# Patient Record
Sex: FEMALE | Race: BLACK OR AFRICAN AMERICAN | Employment: OTHER | URBAN - METROPOLITAN AREA
[De-identification: names, ages, dates, MRNs, and addresses within clinical notes are randomized per-mention and may not be internally consistent; named-entity substitution may affect disease eponyms.]

---

## 2017-04-25 ENCOUNTER — APPOINTMENT (OUTPATIENT)
Dept: GENERAL RADIOLOGY | Age: 77
End: 2017-04-25
Attending: EMERGENCY MEDICINE
Payer: MEDICARE

## 2017-04-25 ENCOUNTER — HOSPITAL ENCOUNTER (EMERGENCY)
Age: 77
Discharge: HOME | End: 2017-04-25
Attending: EMERGENCY MEDICINE
Payer: MEDICARE

## 2017-04-25 VITALS
WEIGHT: 201 LBS | BODY MASS INDEX: 31.55 KG/M2 | HEART RATE: 75 BPM | OXYGEN SATURATION: 98 % | SYSTOLIC BLOOD PRESSURE: 120 MMHG | RESPIRATION RATE: 20 BRPM | HEIGHT: 67 IN | DIASTOLIC BLOOD PRESSURE: 55 MMHG | TEMPERATURE: 98.9 F

## 2017-04-25 DIAGNOSIS — N30.00 ACUTE CYSTITIS WITHOUT HEMATURIA: ICD-10-CM

## 2017-04-25 DIAGNOSIS — K59.00 CONSTIPATION, UNSPECIFIED CONSTIPATION TYPE: Primary | ICD-10-CM

## 2017-04-25 LAB
ALBUMIN SERPL BCP-MCNC: 2.9 G/DL (ref 3.2–4.6)
ALBUMIN/GLOB SERPL: 0.6 {RATIO} (ref 1.2–3.5)
ALP SERPL-CCNC: 69 U/L (ref 50–136)
ALT SERPL-CCNC: 18 U/L (ref 12–65)
ANION GAP BLD CALC-SCNC: 11 MMOL/L (ref 7–16)
AST SERPL W P-5'-P-CCNC: 18 U/L (ref 15–37)
BACTERIA URNS QL MICRO: ABNORMAL /HPF
BASOPHILS # BLD AUTO: 0 K/UL (ref 0–0.2)
BASOPHILS # BLD: 0 % (ref 0–2)
BILIRUB SERPL-MCNC: 0.9 MG/DL (ref 0.2–1.1)
BUN SERPL-MCNC: 30 MG/DL (ref 8–23)
CALCIUM SERPL-MCNC: 8.9 MG/DL (ref 8.3–10.4)
CASTS URNS QL MICRO: ABNORMAL /LPF
CHLORIDE SERPL-SCNC: 114 MMOL/L (ref 98–107)
CO2 SERPL-SCNC: 25 MMOL/L (ref 21–32)
CREAT SERPL-MCNC: 1.54 MG/DL (ref 0.6–1)
DIFFERENTIAL METHOD BLD: NORMAL
EOSINOPHIL # BLD: 0.1 K/UL (ref 0–0.8)
EOSINOPHIL NFR BLD: 1 % (ref 0.5–7.8)
EPI CELLS #/AREA URNS HPF: ABNORMAL /HPF
ERYTHROCYTE [DISTWIDTH] IN BLOOD BY AUTOMATED COUNT: 13.7 % (ref 11.9–14.6)
GLOBULIN SER CALC-MCNC: 4.6 G/DL (ref 2.3–3.5)
GLUCOSE SERPL-MCNC: 198 MG/DL (ref 65–100)
HCT VFR BLD AUTO: 36.4 % (ref 35.8–46.3)
HGB BLD-MCNC: 12.6 G/DL (ref 11.7–15.4)
IMM GRANULOCYTES # BLD: 0 K/UL (ref 0–0.5)
IMM GRANULOCYTES NFR BLD AUTO: 0.4 % (ref 0–5)
INR PPP: 1.1 (ref 0.9–1.2)
LIPASE SERPL-CCNC: 126 U/L (ref 73–393)
LYMPHOCYTES # BLD AUTO: 28 % (ref 13–44)
LYMPHOCYTES # BLD: 3.1 K/UL (ref 0.5–4.6)
MCH RBC QN AUTO: 30.3 PG (ref 26.1–32.9)
MCHC RBC AUTO-ENTMCNC: 34.6 G/DL (ref 31.4–35)
MCV RBC AUTO: 87.5 FL (ref 79.6–97.8)
MONOCYTES # BLD: 0.8 K/UL (ref 0.1–1.3)
MONOCYTES NFR BLD AUTO: 7 % (ref 4–12)
NEUTS SEG # BLD: 7.1 K/UL (ref 1.7–8.2)
NEUTS SEG NFR BLD AUTO: 64 % (ref 43–78)
PLATELET # BLD AUTO: 150 K/UL (ref 150–450)
PMV BLD AUTO: 11.3 FL (ref 10.8–14.1)
POTASSIUM SERPL-SCNC: 4.2 MMOL/L (ref 3.5–5.1)
PROT SERPL-MCNC: 7.5 G/DL (ref 6.3–8.2)
PROTHROMBIN TIME: 11.9 SEC (ref 9.6–12)
RBC # BLD AUTO: 4.16 M/UL (ref 4.05–5.25)
RBC #/AREA URNS HPF: ABNORMAL /HPF
SODIUM SERPL-SCNC: 150 MMOL/L (ref 136–145)
WBC # BLD AUTO: 11.1 K/UL (ref 4.3–11.1)
WBC URNS QL MICRO: ABNORMAL /HPF

## 2017-04-25 PROCEDURE — 85610 PROTHROMBIN TIME: CPT | Performed by: EMERGENCY MEDICINE

## 2017-04-25 PROCEDURE — 81003 URINALYSIS AUTO W/O SCOPE: CPT | Performed by: EMERGENCY MEDICINE

## 2017-04-25 PROCEDURE — 74011250636 HC RX REV CODE- 250/636: Performed by: EMERGENCY MEDICINE

## 2017-04-25 PROCEDURE — 87086 URINE CULTURE/COLONY COUNT: CPT | Performed by: EMERGENCY MEDICINE

## 2017-04-25 PROCEDURE — 96361 HYDRATE IV INFUSION ADD-ON: CPT | Performed by: EMERGENCY MEDICINE

## 2017-04-25 PROCEDURE — 80053 COMPREHEN METABOLIC PANEL: CPT | Performed by: EMERGENCY MEDICINE

## 2017-04-25 PROCEDURE — 85025 COMPLETE CBC W/AUTO DIFF WBC: CPT | Performed by: EMERGENCY MEDICINE

## 2017-04-25 PROCEDURE — 99285 EMERGENCY DEPT VISIT HI MDM: CPT | Performed by: EMERGENCY MEDICINE

## 2017-04-25 PROCEDURE — 96372 THER/PROPH/DIAG INJ SC/IM: CPT | Performed by: EMERGENCY MEDICINE

## 2017-04-25 PROCEDURE — 74011250636 HC RX REV CODE- 250/636

## 2017-04-25 PROCEDURE — 81015 MICROSCOPIC EXAM OF URINE: CPT | Performed by: EMERGENCY MEDICINE

## 2017-04-25 PROCEDURE — 87186 SC STD MICRODIL/AGAR DIL: CPT | Performed by: EMERGENCY MEDICINE

## 2017-04-25 PROCEDURE — 51701 INSERT BLADDER CATHETER: CPT | Performed by: EMERGENCY MEDICINE

## 2017-04-25 PROCEDURE — 83690 ASSAY OF LIPASE: CPT | Performed by: EMERGENCY MEDICINE

## 2017-04-25 PROCEDURE — 87088 URINE BACTERIA CULTURE: CPT | Performed by: EMERGENCY MEDICINE

## 2017-04-25 PROCEDURE — 74020 XR ABD (AP AND ERECT OR DECUB): CPT

## 2017-04-25 PROCEDURE — 77030011943

## 2017-04-25 PROCEDURE — 74011000250 HC RX REV CODE- 250: Performed by: EMERGENCY MEDICINE

## 2017-04-25 PROCEDURE — 96365 THER/PROPH/DIAG IV INF INIT: CPT | Performed by: EMERGENCY MEDICINE

## 2017-04-25 PROCEDURE — 74011000258 HC RX REV CODE- 258: Performed by: EMERGENCY MEDICINE

## 2017-04-25 RX ORDER — MAGNESIUM CITRATE
296 SOLUTION, ORAL ORAL
Qty: 1 BOTTLE | Refills: 0 | Status: SHIPPED | OUTPATIENT
Start: 2017-04-25 | End: 2017-04-25

## 2017-04-25 RX ORDER — CEPHALEXIN 500 MG/1
500 CAPSULE ORAL 3 TIMES DAILY
Qty: 21 CAP | Refills: 0 | Status: SHIPPED | OUTPATIENT
Start: 2017-04-25 | End: 2017-05-02

## 2017-04-25 RX ADMIN — SODIUM CHLORIDE 1000 ML: 900 INJECTION, SOLUTION INTRAVENOUS at 02:52

## 2017-04-25 RX ADMIN — WATER 10 MG: 1 INJECTION INTRAMUSCULAR; INTRAVENOUS; SUBCUTANEOUS at 04:33

## 2017-04-25 RX ADMIN — CEFTRIAXONE 1 G: 1 INJECTION, POWDER, FOR SOLUTION INTRAMUSCULAR; INTRAVENOUS at 03:15

## 2017-04-25 NOTE — ED TRIAGE NOTES
Per EMS Nursing home states bowel movements have decreased along with appetite. History dementia and Alzheimer. . Pt is Bed bound.

## 2017-04-25 NOTE — DISCHARGE INSTRUCTIONS
Constipation: Care Instructions  Your Care Instructions  Constipation means that you have a hard time passing stools (bowel movements). People pass stools from 3 times a day to once every 3 days. What is normal for you may be different. Constipation may occur with pain in the rectum and cramping. The pain may get worse when you try to pass stools. Sometimes there are small amounts of bright red blood on toilet paper or the surface of stools. This is because of enlarged veins near the rectum (hemorrhoids). A few changes in your diet and lifestyle may help you avoid ongoing constipation. Your doctor may also prescribe medicine to help loosen your stool. Some medicines can cause constipation. These include pain medicines and antidepressants. Tell your doctor about all the medicines you take. Your doctor may want to make a medicine change to ease your symptoms. Follow-up care is a key part of your treatment and safety. Be sure to make and go to all appointments, and call your doctor if you are having problems. It's also a good idea to know your test results and keep a list of the medicines you take. How can you care for yourself at home? · Drink plenty of fluids, enough so that your urine is light yellow or clear like water. If you have kidney, heart, or liver disease and have to limit fluids, talk with your doctor before you increase the amount of fluids you drink. · Include high-fiber foods in your diet each day. These include fruits, vegetables, beans, and whole grains. · Get at least 30 minutes of exercise on most days of the week. Walking is a good choice. You also may want to do other activities, such as running, swimming, cycling, or playing tennis or team sports. · Take a fiber supplement, such as Citrucel or Metamucil, every day. Read and follow all instructions on the label. · Schedule time each day for a bowel movement. A daily routine may help.  Take your time having your bowel movement. · Support your feet with a small step stool when you sit on the toilet. This helps flex your hips and places your pelvis in a squatting position. · Your doctor may recommend an over-the-counter laxative to relieve your constipation. Examples are Milk of Magnesia and MiraLax. Read and follow all instructions on the label. Do not use laxatives on a long-term basis. When should you call for help? Call your doctor now or seek immediate medical care if:  · You have new or worse belly pain. · You have new or worse nausea or vomiting. · You have blood in your stools. Watch closely for changes in your health, and be sure to contact your doctor if:  · Your constipation is getting worse. · You do not get better as expected. Where can you learn more? Go to http://ilda-julia.info/. Enter 21 [id number] in the search box to learn more about \"Constipation: Care Instructions. \"  Current as of: May 27, 2016  Content Version: 11.2  © 6177-0953 [employer]. Care instructions adapted under license by [employer] (which disclaims liability or warranty for this information). If you have questions about a medical condition or this instruction, always ask your healthcare professional. [place] [address] any warranty or liability for your use of this information. Urinary Tract Infection in Women: Care Instructions  Your Care Instructions    A urinary tract infection, or UTI, is a general term for an infection anywhere between the kidneys and the urethra (where urine comes out). Most UTIs are bladder infections. They often cause pain or burning when you urinate. UTIs are caused by bacteria and can be cured with antibiotics. Be sure to complete your treatment so that the infection goes away. Follow-up care is a key part of your treatment and safety. Be sure to make and go to all appointments, and call your doctor if you are having problems.  It's also a good idea to know your test results and keep a list of the medicines you take. How can you care for yourself at home? · Take your antibiotics as directed. Do not stop taking them just because you feel better. You need to take the full course of antibiotics. · Drink extra water and other fluids for the next day or two. This may help wash out the bacteria that are causing the infection. (If you have kidney, heart, or liver disease and have to limit fluids, talk with your doctor before you increase your fluid intake.)  · Avoid drinks that are carbonated or have caffeine. They can irritate the bladder. · Urinate often. Try to empty your bladder each time. · To relieve pain, take a hot bath or lay a heating pad set on low over your lower belly or genital area. Never go to sleep with a heating pad in place. To prevent UTIs  · Drink plenty of water each day. This helps you urinate often, which clears bacteria from your system. (If you have kidney, heart, or liver disease and have to limit fluids, talk with your doctor before you increase your fluid intake.)  · Urinate when you need to. · Urinate right after you have sex. · Change sanitary pads often. · Avoid douches, bubble baths, feminine hygiene sprays, and other feminine hygiene products that have deodorants. · After going to the bathroom, wipe from front to back. When should you call for help? Call your doctor now or seek immediate medical care if:  · Symptoms such as fever, chills, nausea, or vomiting get worse or appear for the first time. · You have new pain in your back just below your rib cage. This is called flank pain. · There is new blood or pus in your urine. · You have any problems with your antibiotic medicine. Watch closely for changes in your health, and be sure to contact your doctor if:  · You are not getting better after taking an antibiotic for 2 days. · Your symptoms go away but then come back. Where can you learn more?   Go to http://ilda-julia.info/. Enter W257 in the search box to learn more about \"Urinary Tract Infection in Women: Care Instructions. \"  Current as of: November 28, 2016  Content Version: 11.2  © 6250-9535 [employer]. Care instructions adapted under license by [employer] (which disclaims liability or warranty for this information). If you have questions about a medical condition or this instruction, always ask your healthcare professional. [address] any warranty or liability for your use of this information.

## 2017-04-25 NOTE — ED NOTES
I have reviewed discharge instructions with the patient daughter. The patient daughter verbalized understanding.

## 2017-04-25 NOTE — ED PROVIDER NOTES
HPI Comments: Patient with a history of dementia presents to the ER for concerns of her constipation. Patient has a history of dementia, apparently has been not having bowel movemts for the past 5 days and appears to be in some discomfort. They also report decreased by mouth intake. There is no reported vomiting    Patient is a 78 y.o. female presenting with constipation. The history is provided by the EMS personnel. The history is limited by the condition of the patient. Constipation    This is a new problem. The current episode started more than 2 days ago. Associated symptoms include abdominal pain and constipation. Pertinent negatives include no fever and no back pain. Her past medical history does not include dementia or neurologic disease. Past Medical History:   Diagnosis Date    Alzheimer disease     Anxiety     Arthritis     Asthma     CAD (coronary artery disease)     CHF    Cataract     COPD     DEMENTIA     Diabetes ([address]     GERD (gastroesophageal reflux disease)     H/O seasonal allergies     Hyperlipidemia     Hypertension     Hypothyroidism     Magnesium disorder     Peripheral neuropathy ([address]     Psychiatric disorder     Psychosis     Schizoaffective disorder ([address]     Stroke [facility]     TIA    Tardive dyskinesia        Past Surgical History:   Procedure Laterality Date    HX CHOLECYSTECTOMY      feeding tube placed and removed    HX TOTAL ABDOMINAL HYSTERECTOMY      unsure when or for what reason         Family History:   Problem Relation Age of Onset    Depression Mother     Arthritis-osteo Mother     Thyroid Disease Mother     Heart Attack Mother     Hypertension Mother        Social History     Social History    Marital status:      Spouse name: N/A    Number of children: N/A    Years of education: N/A     Occupational History    Not on file.      Social History Main Topics    Smoking status: Never Smoker    Smokeless tobacco: Not on file    Alcohol use No    Drug use: No    Sexual activity: No     Other Topics Concern    Not on file     Social History Narrative         ALLERGIES: Zithromax [azithromycin]    Review of Systems   Constitutional: Negative for fatigue and fever. HENT: Negative for congestion, dental problem, trouble swallowing and voice change. Eyes: Negative for photophobia and visual disturbance. Respiratory: Negative for shortness of breath, wheezing and stridor. Cardiovascular: Negative for leg swelling. Gastrointestinal: Positive for abdominal pain and constipation. Endocrine: Negative for polydipsia and polyphagia. Genitourinary: Negative for flank pain, frequency and urgency. Musculoskeletal: Negative for back pain and gait problem. Skin: Negative for pallor. Allergic/Immunologic: Negative for food allergies and immunocompromised state. Neurological: Negative for light-headedness and numbness. Hematological: Negative for adenopathy. Does not bruise/bleed easily. Psychiatric/Behavioral: Negative for behavioral problems and confusion. All other systems reviewed and are negative. Vitals:    04/25/17 0035   Pulse: 61   Resp: 20   Temp: 99.2 °F (37.3 °C)   SpO2: 100%   Weight: 91.2 kg (201 lb)   Height: 5' 7\" (1.702 m)            Physical Exam   Constitutional: She appears well-developed and well-nourished. HENT:   Head: Normocephalic and atraumatic. Mouth/Throat: Oropharynx is clear and moist.   Eyes: Conjunctivae and EOM are normal. Pupils are equal, round, and reactive to light. No scleral icterus. Neck: Normal range of motion. Neck supple. No JVD present. No thyromegaly present. Cardiovascular: Normal rate and regular rhythm. Exam reveals no friction rub. No murmur heard. Pulmonary/Chest: Effort normal and breath sounds normal. No respiratory distress. She has no wheezes. Abdominal: Soft. Bowel sounds are normal. She exhibits no distension. There is tenderness.    Musculoskeletal: Normal range of motion. She exhibits no edema or deformity. Neurological: She is alert. Skin: Skin is warm and dry. Nursing note and vitals reviewed. MDM  Number of Diagnoses or Management Options  Diagnosis management comments: She appears to have some minimal abdominal tenderness upon examination   We'll obtain x-ray to rule out obstruction  Also check basic labs as well as urinalysis    3:58 AM  Labs stable evidence of dehydration is noted by her elevated sodium and mildly creatinine  Urinalysis c/w UTI  Pt treated with IV fluid and Rocephin  Appears comfortable and stable for D/c       Amount and/or Complexity of Data Reviewed  Clinical lab tests: ordered and reviewed  Tests in the radiology section of CPT®: ordered and reviewed    Risk of Complications, Morbidity, and/or Mortality  Presenting problems: high  Diagnostic procedures: moderate  Management options: high    Patient Progress  Patient progress: stable    ED Course       Procedures    Results Include:    Recent Results (from the past 24 hour(s))   CBC WITH AUTOMATED DIFF    Collection Time: 04/25/17  1:25 AM   Result Value Ref Range    WBC 11.1 4.3 - 11.1 K/uL    RBC 4.16 4.05 - 5.25 M/uL    HGB 12.6 11.7 - 15.4 g/dL    HCT 36.4 35.8 - 46.3 %    MCV 87.5 79.6 - 97.8 FL    MCH 30.3 26.1 - 32.9 PG    MCHC 34.6 31.4 - 35.0 g/dL    RDW 13.7 11.9 - 14.6 %    PLATELET 721 010 - 921 K/uL    MPV 11.3 10.8 - 14.1 FL    DF AUTOMATED      NEUTROPHILS 64 43 - 78 %    LYMPHOCYTES 28 13 - 44 %    MONOCYTES 7 4.0 - 12.0 %    EOSINOPHILS 1 0.5 - 7.8 %    BASOPHILS 0 0.0 - 2.0 %    IMMATURE GRANULOCYTES 0.4 0.0 - 5.0 %    ABS. NEUTROPHILS 7.1 1.7 - 8.2 K/UL    ABS. LYMPHOCYTES 3.1 0.5 - 4.6 K/UL    ABS. MONOCYTES 0.8 0.1 - 1.3 K/UL    ABS. EOSINOPHILS 0.1 0.0 - 0.8 K/UL    ABS. BASOPHILS 0.0 0.0 - 0.2 K/UL    ABS. IMM.  GRANS. 0.0 0.0 - 0.5 K/UL   METABOLIC PANEL, COMPREHENSIVE    Collection Time: 04/25/17  1:25 AM   Result Value Ref Range    Sodium 150 (H) 136 - 145 mmol/L    Potassium 4.2 3.5 - 5.1 mmol/L    Chloride 114 (H) 98 - 107 mmol/L    CO2 25 21 - 32 mmol/L    Anion gap 11 7 - 16 mmol/L    Glucose 198 (H) 65 - 100 mg/dL    BUN 30 (H) 8 - 23 MG/DL    Creatinine 1.54 (H) 0.6 - 1.0 MG/DL    GFR est AA 42 (L) >60 ml/min/1.73m2    GFR est non-AA 35 (L) >60 ml/min/1.73m2    Calcium 8.9 8.3 - 10.4 MG/DL    Bilirubin, total 0.9 0.2 - 1.1 MG/DL    ALT (SGPT) 18 12 - 65 U/L    AST (SGOT) 18 15 - 37 U/L    Alk. phosphatase 69 50 - 136 U/L    Protein, total 7.5 6.3 - 8.2 g/dL    Albumin 2.9 (L) 3.2 - 4.6 g/dL    Globulin 4.6 (H) 2.3 - 3.5 g/dL    A-G Ratio 0.6 (L) 1.2 - 3.5     PROTHROMBIN TIME + INR    Collection Time: 04/25/17  1:25 AM   Result Value Ref Range    Prothrombin time 11.9 9.6 - 12.0 sec    INR 1.1 0.9 - 1.2     LIPASE    Collection Time: 04/25/17  1:25 AM   Result Value Ref Range    Lipase 126 73 - 393 U/L   URINE MICROSCOPIC    Collection Time: 04/25/17  2:39 AM   Result Value Ref Range    WBC 0-3 0 /hpf    RBC 0-3 0 /hpf    Epithelial cells 0-3 0 /hpf    Bacteria 4+ (H) 0 /hpf    Casts 0-3 0 /lpf       XR ABD (AP AND ERECT OR DECUB) (Final result) Result time: 04/25/17 02:54:07     Final result by Aure Bennett MD (04/25/17 02:54:07)     Impression:     IMPRESSION: Suboptimal exam without acute findings.     Narrative:     Exam: Supine radiograph of the abdomen    INDICATION: Abdominal pain, constipation    COMPARISON: Acute abdominal series from January 31, 2012    FINDINGS: Suboptimal exam due to motion artifact. Nonspecific bowel gas pattern. No suspicious abdominal calcifications. No gross acute osseous abnormality.

## 2017-04-28 LAB
BACTERIA SPEC CULT: ABNORMAL
BACTERIA SPEC CULT: ABNORMAL
SERVICE CMNT-IMP: ABNORMAL

## 2017-05-20 ENCOUNTER — APPOINTMENT (OUTPATIENT)
Dept: GENERAL RADIOLOGY | Age: 77
DRG: 392 | End: 2017-05-20
Attending: EMERGENCY MEDICINE
Payer: MEDICARE

## 2017-05-20 ENCOUNTER — HOSPITAL ENCOUNTER (INPATIENT)
Age: 77
LOS: 6 days | Discharge: NURSING, REHAB OR LONG-TERM CARE | DRG: 392 | End: 2017-05-26
Attending: EMERGENCY MEDICINE | Admitting: INTERNAL MEDICINE
Payer: MEDICARE

## 2017-05-20 DIAGNOSIS — E87.0 HYPERNATREMIA: Primary | ICD-10-CM

## 2017-05-20 DIAGNOSIS — F02.80 ALZHEIMER'S DEMENTIA WITHOUT BEHAVIORAL DISTURBANCE, UNSPECIFIED TIMING OF DEMENTIA ONSET: ICD-10-CM

## 2017-05-20 DIAGNOSIS — G30.9 ALZHEIMER'S DEMENTIA WITHOUT BEHAVIORAL DISTURBANCE, UNSPECIFIED TIMING OF DEMENTIA ONSET: ICD-10-CM

## 2017-05-20 PROBLEM — N17.9 AKI (ACUTE KIDNEY INJURY) (HCC): Status: ACTIVE | Noted: 2017-05-20

## 2017-05-20 LAB
ALBUMIN SERPL BCP-MCNC: 3.6 G/DL (ref 3.2–4.6)
ALBUMIN/GLOB SERPL: 0.8 {RATIO} (ref 1.2–3.5)
ALP SERPL-CCNC: 82 U/L (ref 50–136)
ALT SERPL-CCNC: 54 U/L (ref 12–65)
ANION GAP BLD CALC-SCNC: 11 MMOL/L (ref 7–16)
APPEARANCE UR: CLEAR
AST SERPL W P-5'-P-CCNC: 29 U/L (ref 15–37)
ATRIAL RATE: 100 BPM
BASOPHILS # BLD AUTO: 0.1 K/UL (ref 0–0.2)
BASOPHILS # BLD: 0 % (ref 0–2)
BILIRUB SERPL-MCNC: 1.3 MG/DL (ref 0.2–1.1)
BILIRUB UR QL: NEGATIVE
BUN SERPL-MCNC: 59 MG/DL (ref 8–23)
CALCIUM SERPL-MCNC: 9.8 MG/DL (ref 8.3–10.4)
CALCULATED P AXIS, ECG09: 108 DEGREES
CALCULATED R AXIS, ECG10: 49 DEGREES
CALCULATED T AXIS, ECG11: 66 DEGREES
CHLORIDE SERPL-SCNC: 125 MMOL/L (ref 98–107)
CO2 SERPL-SCNC: 24 MMOL/L (ref 21–32)
COLOR UR: YELLOW
CREAT SERPL-MCNC: 2.49 MG/DL (ref 0.6–1)
DIAGNOSIS, 93000: NORMAL
DIFFERENTIAL METHOD BLD: ABNORMAL
EOSINOPHIL # BLD: 0 K/UL (ref 0–0.8)
EOSINOPHIL NFR BLD: 0 % (ref 0.5–7.8)
ERYTHROCYTE [DISTWIDTH] IN BLOOD BY AUTOMATED COUNT: 14.3 % (ref 11.9–14.6)
GLOBULIN SER CALC-MCNC: 4.7 G/DL (ref 2.3–3.5)
GLUCOSE BLD STRIP.AUTO-MCNC: 213 MG/DL (ref 65–100)
GLUCOSE SERPL-MCNC: 241 MG/DL (ref 65–100)
GLUCOSE UR STRIP.AUTO-MCNC: NEGATIVE MG/DL
HCT VFR BLD AUTO: 42.8 % (ref 35.8–46.3)
HGB BLD-MCNC: 14.5 G/DL (ref 11.7–15.4)
HGB UR QL STRIP: NEGATIVE
IMM GRANULOCYTES # BLD: 0 K/UL (ref 0–0.5)
IMM GRANULOCYTES NFR BLD AUTO: 0.3 % (ref 0–5)
KETONES UR QL STRIP.AUTO: NEGATIVE MG/DL
LACTATE BLD-SCNC: 2.3 MMOL/L (ref 0.5–1.9)
LEUKOCYTE ESTERASE UR QL STRIP.AUTO: NEGATIVE
LYMPHOCYTES # BLD AUTO: 20 % (ref 13–44)
LYMPHOCYTES # BLD: 2.4 K/UL (ref 0.5–4.6)
MCH RBC QN AUTO: 30.9 PG (ref 26.1–32.9)
MCHC RBC AUTO-ENTMCNC: 33.9 G/DL (ref 31.4–35)
MCV RBC AUTO: 91.1 FL (ref 79.6–97.8)
MONOCYTES # BLD: 1.3 K/UL (ref 0.1–1.3)
MONOCYTES NFR BLD AUTO: 11 % (ref 4–12)
NEUTS SEG # BLD: 8 K/UL (ref 1.7–8.2)
NEUTS SEG NFR BLD AUTO: 69 % (ref 43–78)
NITRITE UR QL STRIP.AUTO: NEGATIVE
P-R INTERVAL, ECG05: 102 MS
PH UR STRIP: 5.5 [PH] (ref 5–9)
PLATELET # BLD AUTO: 170 K/UL (ref 150–450)
PMV BLD AUTO: 11.9 FL (ref 10.8–14.1)
POTASSIUM SERPL-SCNC: 4.3 MMOL/L (ref 3.5–5.1)
PROCALCITONIN SERPL-MCNC: <0.1 NG/ML
PROT SERPL-MCNC: 8.3 G/DL (ref 6.3–8.2)
PROT UR STRIP-MCNC: NEGATIVE MG/DL
Q-T INTERVAL, ECG07: 350 MS
QRS DURATION, ECG06: 74 MS
QTC CALCULATION (BEZET), ECG08: 451 MS
RBC # BLD AUTO: 4.7 M/UL (ref 4.05–5.25)
SODIUM SERPL-SCNC: 160 MMOL/L (ref 136–145)
SP GR UR REFRACTOMETRY: 1.01 (ref 1–1.02)
UROBILINOGEN UR QL STRIP.AUTO: 0.2 EU/DL (ref 0.2–1)
VENTRICULAR RATE, ECG03: 100 BPM
WBC # BLD AUTO: 11.8 K/UL (ref 4.3–11.1)

## 2017-05-20 PROCEDURE — 87040 BLOOD CULTURE FOR BACTERIA: CPT

## 2017-05-20 PROCEDURE — 77030011943

## 2017-05-20 PROCEDURE — 82962 GLUCOSE BLOOD TEST: CPT

## 2017-05-20 PROCEDURE — 84145 PROCALCITONIN (PCT): CPT

## 2017-05-20 PROCEDURE — 81003 URINALYSIS AUTO W/O SCOPE: CPT | Performed by: EMERGENCY MEDICINE

## 2017-05-20 PROCEDURE — 65270000029 HC RM PRIVATE

## 2017-05-20 PROCEDURE — 74011250636 HC RX REV CODE- 250/636: Performed by: EMERGENCY MEDICINE

## 2017-05-20 PROCEDURE — 81003 URINALYSIS AUTO W/O SCOPE: CPT | Performed by: INTERNAL MEDICINE

## 2017-05-20 PROCEDURE — 74011250636 HC RX REV CODE- 250/636: Performed by: INTERNAL MEDICINE

## 2017-05-20 PROCEDURE — 36415 COLL VENOUS BLD VENIPUNCTURE: CPT | Performed by: INTERNAL MEDICINE

## 2017-05-20 PROCEDURE — 87205 SMEAR GRAM STAIN: CPT

## 2017-05-20 PROCEDURE — 74011636637 HC RX REV CODE- 636/637: Performed by: INTERNAL MEDICINE

## 2017-05-20 PROCEDURE — 80053 COMPREHEN METABOLIC PANEL: CPT

## 2017-05-20 PROCEDURE — 96361 HYDRATE IV INFUSION ADD-ON: CPT | Performed by: EMERGENCY MEDICINE

## 2017-05-20 PROCEDURE — 85025 COMPLETE CBC W/AUTO DIFF WBC: CPT

## 2017-05-20 PROCEDURE — 87077 CULTURE AEROBIC IDENTIFY: CPT

## 2017-05-20 PROCEDURE — 87086 URINE CULTURE/COLONY COUNT: CPT | Performed by: INTERNAL MEDICINE

## 2017-05-20 PROCEDURE — 99285 EMERGENCY DEPT VISIT HI MDM: CPT | Performed by: EMERGENCY MEDICINE

## 2017-05-20 PROCEDURE — 80048 BASIC METABOLIC PNL TOTAL CA: CPT | Performed by: INTERNAL MEDICINE

## 2017-05-20 PROCEDURE — 93005 ELECTROCARDIOGRAM TRACING: CPT | Performed by: EMERGENCY MEDICINE

## 2017-05-20 PROCEDURE — 83605 ASSAY OF LACTIC ACID: CPT

## 2017-05-20 PROCEDURE — 74011000250 HC RX REV CODE- 250: Performed by: INTERNAL MEDICINE

## 2017-05-20 PROCEDURE — 96374 THER/PROPH/DIAG INJ IV PUSH: CPT | Performed by: EMERGENCY MEDICINE

## 2017-05-20 PROCEDURE — 87186 SC STD MICRODIL/AGAR DIL: CPT

## 2017-05-20 PROCEDURE — 71010 XR CHEST PORT: CPT

## 2017-05-20 PROCEDURE — 51701 INSERT BLADDER CATHETER: CPT | Performed by: EMERGENCY MEDICINE

## 2017-05-20 PROCEDURE — 74011000258 HC RX REV CODE- 258: Performed by: INTERNAL MEDICINE

## 2017-05-20 RX ORDER — BENZTROPINE MESYLATE 1 MG/ML
0.5 INJECTION INTRAMUSCULAR; INTRAVENOUS 2 TIMES DAILY
Status: DISCONTINUED | OUTPATIENT
Start: 2017-05-20 | End: 2017-05-21

## 2017-05-20 RX ORDER — LORAZEPAM 2 MG/ML
2 INJECTION INTRAMUSCULAR
Status: DISCONTINUED | OUTPATIENT
Start: 2017-05-20 | End: 2017-05-26 | Stop reason: HOSPADM

## 2017-05-20 RX ORDER — FAMOTIDINE 10 MG/ML
20 INJECTION INTRAVENOUS EVERY 24 HOURS
Status: DISCONTINUED | OUTPATIENT
Start: 2017-05-20 | End: 2017-05-25

## 2017-05-20 RX ORDER — ONDANSETRON 2 MG/ML
4 INJECTION INTRAMUSCULAR; INTRAVENOUS
Status: DISCONTINUED | OUTPATIENT
Start: 2017-05-20 | End: 2017-05-26 | Stop reason: HOSPADM

## 2017-05-20 RX ORDER — INSULIN LISPRO 100 [IU]/ML
INJECTION, SOLUTION INTRAVENOUS; SUBCUTANEOUS
Status: DISCONTINUED | OUTPATIENT
Start: 2017-05-20 | End: 2017-05-21

## 2017-05-20 RX ORDER — LORAZEPAM 2 MG/ML
2 INJECTION INTRAMUSCULAR
Status: COMPLETED | OUTPATIENT
Start: 2017-05-20 | End: 2017-05-20

## 2017-05-20 RX ORDER — HALOPERIDOL 5 MG/ML
2 INJECTION INTRAMUSCULAR
Status: DISCONTINUED | OUTPATIENT
Start: 2017-05-20 | End: 2017-05-26 | Stop reason: HOSPADM

## 2017-05-20 RX ORDER — LEVOTHYROXINE SODIUM 75 UG/1
75 TABLET ORAL
Status: DISCONTINUED | OUTPATIENT
Start: 2017-05-21 | End: 2017-05-26 | Stop reason: HOSPADM

## 2017-05-20 RX ADMIN — FAMOTIDINE 20 MG: 10 INJECTION, SOLUTION INTRAVENOUS at 23:19

## 2017-05-20 RX ADMIN — LORAZEPAM 2 MG: 2 INJECTION, SOLUTION INTRAMUSCULAR; INTRAVENOUS at 17:50

## 2017-05-20 RX ADMIN — BENZTROPINE MESYLATE 0.5 MG: 1 INJECTION INTRAMUSCULAR; INTRAVENOUS at 23:19

## 2017-05-20 RX ADMIN — HALOPERIDOL LACTATE 2 MG: 5 INJECTION, SOLUTION INTRAMUSCULAR at 23:56

## 2017-05-20 RX ADMIN — INSULIN LISPRO 4 UNITS: 100 INJECTION, SOLUTION INTRAVENOUS; SUBCUTANEOUS at 23:29

## 2017-05-20 RX ADMIN — SODIUM CHLORIDE 1000 ML: 900 INJECTION, SOLUTION INTRAVENOUS at 16:04

## 2017-05-20 RX ADMIN — PIPERACILLIN SODIUM,TAZOBACTAM SODIUM 3.38 G: 3; .375 INJECTION, POWDER, FOR SOLUTION INTRAVENOUS at 23:20

## 2017-05-20 RX ADMIN — SODIUM CHLORIDE: 234 INJECTION INTRAMUSCULAR; INTRAVENOUS; SUBCUTANEOUS at 23:19

## 2017-05-20 NOTE — PROGRESS NOTES
Was called by ER saying there were no admission orders.   I see a note saying H&P dictated and some signed and held orders but nothing on active problems list.  i placed an order for observation for fever as per discussion with ER

## 2017-05-20 NOTE — ED PROVIDER NOTES
HPI Comments: Patient is a 82-year-old female with a history of dementia who is sent to the emergency department today from her nursing home for further evaluation. All the history is obtained from the daughter who is at the bedside. Daughter states that in the past month the patient has had a markedly decline in her function in her health. She states that her psychiatrist is changing her medications and she is having some increased tremors and abnormal movements. She is now nonverbal.   In the last 4 days she has not wanted to eat or drink and that is why the facility sent her here to the emergency department for further evaluation. Patient is a 78 y.o. female presenting with fever. The history is provided by a relative. The history is limited by the condition of the patient. Fever           Past Medical History:   Diagnosis Date    Alzheimer disease     Anxiety     Arthritis     Asthma     CAD (coronary artery disease)     CHF    Cataract     COPD     DEMENTIA     Diabetes ([address]     GERD (gastroesophageal reflux disease)     H/O seasonal allergies     Hyperlipidemia     Hypertension     Hypothyroidism     Magnesium disorder     Peripheral neuropathy ([address]     Psychiatric disorder     Psychosis     Schizoaffective disorder ([address]     Stroke ([address]     TIA    Tardive dyskinesia        Past Surgical History:   Procedure Laterality Date    HX CHOLECYSTECTOMY      feeding tube placed and removed    HX TOTAL ABDOMINAL HYSTERECTOMY      unsure when or for what reason         Family History:   Problem Relation Age of Onset    Depression Mother     Arthritis-osteo Mother     Thyroid Disease Mother     Heart Attack Mother     Hypertension Mother        Social History     Social History    Marital status:      Spouse name: N/A    Number of children: N/A    Years of education: N/A     Occupational History    Not on file.      Social History Main Topics    Smoking status: Never Smoker    Smokeless tobacco: Not on file    Alcohol use No    Drug use: No    Sexual activity: No     Other Topics Concern    Not on file     Social History Narrative         ALLERGIES: Zithromax [azithromycin]    Review of Systems   Unable to perform ROS: Dementia   Constitutional: Positive for fever. Vitals:    05/20/17 1529   BP: 132/67   Pulse: 100   Resp: 24   Temp: 99.2 °F (37.3 °C)   SpO2: 98%   Weight: 91.2 kg (201 lb)   Height: 5' 7\" (1.702 m)            Physical Exam   Constitutional: She appears well-developed. Thin elderly   HENT:   Head: Normocephalic and atraumatic. Very dry mucous membranes   Neck: Normal range of motion. Neck supple. Cardiovascular: Normal rate and regular rhythm. Abdominal: Soft. Bowel sounds are normal.   Musculoskeletal: She exhibits no tenderness or deformity. Lymphadenopathy:     She has no cervical adenopathy. Neurological:   Nonverbal with generalized weakness, no focal deficits. Repetitive tremors of the arms and pill rolling movements of the mouth. Nursing note and vitals reviewed. MDM  Number of Diagnoses or Management Options  Diagnosis management comments: Patient is a 79-year-old female with dementia who is sent to the emergency department for poor intake for several days. Differential diagnosis includes dementia, Alzheimer's, dehydration, renal failure, metabolic disturbance, infection    Laboratory values show hypernatremia with a sodium 160 and acute renal failure compared to previous values. IV fluids have been started. Urinalysis is pending. She will need admission to the hospital for further management.        Amount and/or Complexity of Data Reviewed  Clinical lab tests: ordered and reviewed  Tests in the radiology section of CPT®: ordered and reviewed  Review and summarize past medical records: yes    Risk of Complications, Morbidity, and/or Mortality  Presenting problems: moderate  Diagnostic procedures: moderate  Management options: moderate    Patient Progress  Patient progress: stable    ED Course   4:48 PM  Discussed the case with the hospitalist who will evaluate the patient in the ER for admission. Voice dictation software was used during the making of this note. This software is not perfect and grammatical and other typographical errors may be present. This note has been proofread, but may still contain errors.   Ino Phoenix MD; 5/20/2017 @4:49 PM   ===================================================================        Procedures

## 2017-05-20 NOTE — ED TRIAGE NOTES
Per EMS patient from [address] [place] [address]. Called out because patient has not been eating or drinking for 4 days to 1 week. Non purposeful  Movements and non verbal are normal. Patient febrile and tachycardic with EMS, but no known source.

## 2017-05-20 NOTE — Clinical Note
Patient Class[de-identified] Observation [099] Type of Bed: Medical [8] Reason for Observation: fever Admitting Physician: [place] [id number], [address] [[id number]] Attending Physician: [place] [id number], [address] [place] [address] [[id number]] Diagnosis: fever

## 2017-05-21 ENCOUNTER — APPOINTMENT (OUTPATIENT)
Dept: GENERAL RADIOLOGY | Age: 77
DRG: 392 | End: 2017-05-21
Attending: HOSPITALIST
Payer: MEDICARE

## 2017-05-21 LAB
ANION GAP BLD CALC-SCNC: 12 MMOL/L (ref 7–16)
ANION GAP BLD CALC-SCNC: 15 MMOL/L (ref 7–16)
ANION GAP BLD CALC-SCNC: 15 MMOL/L (ref 7–16)
ANION GAP BLD CALC-SCNC: 16 MMOL/L (ref 7–16)
BUN SERPL-MCNC: 49 MG/DL (ref 8–23)
BUN SERPL-MCNC: 51 MG/DL (ref 8–23)
BUN SERPL-MCNC: 56 MG/DL (ref 8–23)
BUN SERPL-MCNC: 59 MG/DL (ref 8–23)
CALCIUM SERPL-MCNC: 8.7 MG/DL (ref 8.3–10.4)
CALCIUM SERPL-MCNC: 8.8 MG/DL (ref 8.3–10.4)
CALCIUM SERPL-MCNC: 9.2 MG/DL (ref 8.3–10.4)
CALCIUM SERPL-MCNC: 9.2 MG/DL (ref 8.3–10.4)
CHLORIDE SERPL-SCNC: 124 MMOL/L (ref 98–107)
CHLORIDE SERPL-SCNC: 125 MMOL/L (ref 98–107)
CHLORIDE SERPL-SCNC: 126 MMOL/L (ref 98–107)
CHLORIDE SERPL-SCNC: 129 MMOL/L (ref 98–107)
CO2 SERPL-SCNC: 19 MMOL/L (ref 21–32)
CO2 SERPL-SCNC: 19 MMOL/L (ref 21–32)
CO2 SERPL-SCNC: 20 MMOL/L (ref 21–32)
CO2 SERPL-SCNC: 21 MMOL/L (ref 21–32)
CREAT SERPL-MCNC: 1.9 MG/DL (ref 0.6–1)
CREAT SERPL-MCNC: 2.06 MG/DL (ref 0.6–1)
CREAT SERPL-MCNC: 2.25 MG/DL (ref 0.6–1)
CREAT SERPL-MCNC: 2.29 MG/DL (ref 0.6–1)
ERYTHROCYTE [DISTWIDTH] IN BLOOD BY AUTOMATED COUNT: 14.5 % (ref 11.9–14.6)
GLUCOSE BLD STRIP.AUTO-MCNC: 132 MG/DL (ref 65–100)
GLUCOSE BLD STRIP.AUTO-MCNC: 391 MG/DL (ref 65–100)
GLUCOSE SERPL-MCNC: 105 MG/DL (ref 65–100)
GLUCOSE SERPL-MCNC: 154 MG/DL (ref 65–100)
GLUCOSE SERPL-MCNC: 241 MG/DL (ref 65–100)
GLUCOSE SERPL-MCNC: 340 MG/DL (ref 65–100)
HCT VFR BLD AUTO: 42.2 % (ref 35.8–46.3)
HGB BLD-MCNC: 13.6 G/DL (ref 11.7–15.4)
LACTATE SERPL-SCNC: 1.5 MMOL/L (ref 0.4–2)
LACTATE SERPL-SCNC: 2.5 MMOL/L (ref 0.4–2)
MAGNESIUM SERPL-MCNC: 2.9 MG/DL (ref 1.8–2.4)
MCH RBC QN AUTO: 30.4 PG (ref 26.1–32.9)
MCHC RBC AUTO-ENTMCNC: 32.2 G/DL (ref 31.4–35)
MCV RBC AUTO: 94.4 FL (ref 79.6–97.8)
PLATELET # BLD AUTO: 147 K/UL (ref 150–450)
PMV BLD AUTO: 13 FL (ref 10.8–14.1)
POTASSIUM SERPL-SCNC: 4.2 MMOL/L (ref 3.5–5.1)
POTASSIUM SERPL-SCNC: 4.2 MMOL/L (ref 3.5–5.1)
POTASSIUM SERPL-SCNC: 4.3 MMOL/L (ref 3.5–5.1)
POTASSIUM SERPL-SCNC: 4.6 MMOL/L (ref 3.5–5.1)
RBC # BLD AUTO: 4.47 M/UL (ref 4.05–5.25)
SODIUM SERPL-SCNC: 156 MMOL/L (ref 136–145)
SODIUM SERPL-SCNC: 159 MMOL/L (ref 136–145)
SODIUM SERPL-SCNC: 161 MMOL/L (ref 136–145)
SODIUM SERPL-SCNC: 165 MMOL/L (ref 136–145)
WBC # BLD AUTO: 14.7 K/UL (ref 4.3–11.1)

## 2017-05-21 PROCEDURE — 65270000029 HC RM PRIVATE

## 2017-05-21 PROCEDURE — 74011000258 HC RX REV CODE- 258: Performed by: INTERNAL MEDICINE

## 2017-05-21 PROCEDURE — 74011250636 HC RX REV CODE- 250/636: Performed by: INTERNAL MEDICINE

## 2017-05-21 PROCEDURE — 74011250636 HC RX REV CODE- 250/636: Performed by: HOSPITALIST

## 2017-05-21 PROCEDURE — 77030008771 HC TU NG SALEM SUMP -A

## 2017-05-21 PROCEDURE — 77030018798 HC PMP KT ENTRL FED COVD -A

## 2017-05-21 PROCEDURE — 74000 XR ABD (KUB): CPT

## 2017-05-21 PROCEDURE — 83735 ASSAY OF MAGNESIUM: CPT | Performed by: INTERNAL MEDICINE

## 2017-05-21 PROCEDURE — 36415 COLL VENOUS BLD VENIPUNCTURE: CPT | Performed by: INTERNAL MEDICINE

## 2017-05-21 PROCEDURE — 74011000250 HC RX REV CODE- 250: Performed by: INTERNAL MEDICINE

## 2017-05-21 PROCEDURE — 80048 BASIC METABOLIC PNL TOTAL CA: CPT | Performed by: INTERNAL MEDICINE

## 2017-05-21 PROCEDURE — 83605 ASSAY OF LACTIC ACID: CPT | Performed by: INTERNAL MEDICINE

## 2017-05-21 PROCEDURE — 74011636637 HC RX REV CODE- 636/637: Performed by: INTERNAL MEDICINE

## 2017-05-21 PROCEDURE — 82962 GLUCOSE BLOOD TEST: CPT

## 2017-05-21 PROCEDURE — 85027 COMPLETE CBC AUTOMATED: CPT | Performed by: INTERNAL MEDICINE

## 2017-05-21 PROCEDURE — 74011250637 HC RX REV CODE- 250/637: Performed by: INTERNAL MEDICINE

## 2017-05-21 RX ORDER — CLONAZEPAM 1 MG/1
1 TABLET ORAL 2 TIMES DAILY
Status: DISCONTINUED | OUTPATIENT
Start: 2017-05-21 | End: 2017-05-26 | Stop reason: HOSPADM

## 2017-05-21 RX ORDER — INSULIN LISPRO 100 [IU]/ML
INJECTION, SOLUTION INTRAVENOUS; SUBCUTANEOUS EVERY 6 HOURS
Status: DISCONTINUED | OUTPATIENT
Start: 2017-05-22 | End: 2017-05-26 | Stop reason: HOSPADM

## 2017-05-21 RX ORDER — VANCOMYCIN/0.9 % SOD CHLORIDE 1.5G/250ML
1500 PLASTIC BAG, INJECTION (ML) INTRAVENOUS EVERY 24 HOURS
Status: DISCONTINUED | OUTPATIENT
Start: 2017-05-21 | End: 2017-05-23

## 2017-05-21 RX ORDER — DEXTROSE MONOHYDRATE 50 MG/ML
100 INJECTION, SOLUTION INTRAVENOUS CONTINUOUS
Status: DISCONTINUED | OUTPATIENT
Start: 2017-05-21 | End: 2017-05-21

## 2017-05-21 RX ADMIN — INSULIN LISPRO 4 UNITS: 100 INJECTION, SOLUTION INTRAVENOUS; SUBCUTANEOUS at 08:48

## 2017-05-21 RX ADMIN — CLONAZEPAM 1 MG: 1 TABLET ORAL at 08:48

## 2017-05-21 RX ADMIN — LEVOTHYROXINE SODIUM 75 MCG: 75 TABLET ORAL at 08:48

## 2017-05-21 RX ADMIN — DEXTROSE MONOHYDRATE 100 ML/HR: 5 INJECTION, SOLUTION INTRAVENOUS at 00:59

## 2017-05-21 RX ADMIN — SODIUM CHLORIDE: 234 INJECTION INTRAMUSCULAR; INTRAVENOUS; SUBCUTANEOUS at 14:25

## 2017-05-21 RX ADMIN — VANCOMYCIN HYDROCHLORIDE 1500 MG: 10 INJECTION, POWDER, LYOPHILIZED, FOR SOLUTION INTRAVENOUS at 18:58

## 2017-05-21 RX ADMIN — INSULIN LISPRO 10 UNITS: 100 INJECTION, SOLUTION INTRAVENOUS; SUBCUTANEOUS at 12:18

## 2017-05-21 RX ADMIN — PIPERACILLIN SODIUM,TAZOBACTAM SODIUM 3.38 G: 3; .375 INJECTION, POWDER, FOR SOLUTION INTRAVENOUS at 14:36

## 2017-05-21 RX ADMIN — CLONAZEPAM 1 MG: 1 TABLET ORAL at 18:26

## 2017-05-21 NOTE — ED NOTES
TRANSFER - OUT REPORT:    Verbal report given to Aaron Kristal on [place]  being transferred to  for routine progression of care       Report consisted of patients Situation, Background, Assessment and   Recommendations(SBAR). Information from the following report(s) SBAR, ED Summary, Procedure Summary, Intake/Output, MAR and Recent Results was reviewed with the receiving nurse. Lines:   Peripheral IV 05/20/17 Right Antecubital (Active)   Site Assessment Clean, dry, & intact 5/20/2017  8:16 PM   Phlebitis Assessment 0 5/20/2017  8:16 PM   Infiltration Assessment 0 5/20/2017  8:16 PM   Dressing Status Clean, dry, & intact 5/20/2017  8:16 PM        Opportunity for questions and clarification was provided.       Patient transported with:   [facility]

## 2017-05-21 NOTE — PROGRESS NOTES
Pharmacokinetic Consult to Pharmacist    Libby Bauer is a 78 y.o. female with vancomycin added to zosyn for +Bcx      Height: 5' 7\" (170.2 cm)  Weight: 91.2 kg (201 lb)  Lab Results   Component Value Date/Time    BUN 59 05/21/2017 07:40 AM    Creatinine 2.29 05/21/2017 07:40 AM    WBC 14.7 05/21/2017 07:40 AM    Procalcitonin <0.1 05/20/2017 03:35 PM      Estimated Creatinine Clearance: 24.6 mL/min (based on Cr of 2.29). CULTURES:  5/20 Bcx: GPC 1/2   Ucx: NG pending      Day 1 of vancomycin. Goal trough is 15-20. Patient is quite dehydrated currently with Scr 2.29. Expect Scr to improve with aggressive fluids. Will start dosing at 1500 mg q24h  Will continue to follow patient. Thank you,  Alie Santamaria, Pharm. D.   Clinical Pharmacist  [phone]

## 2017-05-21 NOTE — PROGRESS NOTES
Assisted Dr. Jake Bello at bedside to retape and reposition NG tube to 65 bakari, verified placement with Dr. Jake Bello with auscultation. Retaped NG tube. Remains clamped. Updated primary nurse Maida Glez.

## 2017-05-21 NOTE — H&P
[address]   HISTORY AND PHYSICAL       Name:  Sanjuanita Fu   MR#:  [id number]   :  1940   Account #:  [de-identified]   Date of Adm:  2017       TIME OF ADMISSION: 5:20 p.m. CHIEF COMPLAINT: Worsening mental status. HISTORY OF PRESENT ILLNESS: This is a 81-year-old female patient   who has a past medical history of obesity, hypertension,   diabetes type 2, and advanced dementia, who came into the   emergency room with the chief complaint of worsening mental   status. This is a 81-year-old female patient who was brought into the   emergency room by her daughters. According to them, for the last   2 months they have noticed an important decline in the general   condition of Mrs. Batres. According to them, she has become more   confused, agitated, she has abnormal involuntary movements   and tremors. She is not eating or drinking on a regular basis,   and her medications have been exchanged several times in the   last week with the hope of improvement; however, so far, her   condition has been getting worse. Today, she was brought into   the emergency room because in the last 4 days she has not wanted   to eat or drink, and her facility sent her here for further   evaluation. In the emergency room, she was found to have a blood   pressure of 130/67, her heart rate was 100, her respiratory rate   was 24, O2 saturation was 98%. The patient was having abnormal   movements of her arms and head and she was extremely agitated   and with evidence of dehydration. Her initial blood workup   showed a lactic acid of 2.3, white blood cell count of 11.8,   sodium of 160, potassium of 4.3, creatinine of 2.4, with   baseline around 1.5. Her chest x-ray was basically unremarkable. She was started on IV hydration, and she was presented to the   hospitalist team for admission.     REVIEW OF SYSTEMS: A review of 14 systems cannot be performed   because the patient is demented and nonverbal.    PAST MEDICAL HISTORY   1. Obesity. 2. Advanced Alzheimer disease. 3. History of asthma. 4. Diabetes type 2.   5. Hypothyroidism. 6. Schizoaffective disorder. 7. Dyskinesia. PAST SURGICAL HISTORY:    1. Cholecystectomy. 2. Previous exploratory laparotomy. 3. Hysterectomy. SOCIAL HISTORY: Denies smoking, alcohol use or illicit drug use. FAMILY HISTORY: Positive for depression, heart attack,   hypertension, thyroid disease. ALLERGIES: Evetta Fossa. PHYSICAL EXAMINATION   VITAL SIGNS: Blood pressure 130/55, heart rate 78, respiratory   rate of 18, O2 saturation of 95%. EYES: PERRLA. EARS, NOSE, MOUTH AND THROAT: There is no evidence of pharyngeal   erythema, edema, or purulent exudates. RESPIRATORY: Clear breath sounds bilaterally. CARDIOVASCULAR: Regular rate and rhythm with no murmurs. GASTROINTESTINAL: Abdomen is soft and nontender with positive   bowel sounds. GENITOURINARY: Unremarkable. MUSCULOSKELETAL: No evidence of trauma. SKIN: No evidence of active skin lesions. NEUROLOGIC: The patient is completely demented, agitated. She is   having chorea with abnormal movement of her head, tongue, arms,   and lower extremities. No evidence of focal motor weakness. PSYCHIATRIC: Demented. HEMATOLOGIC/LYMPHATIC/IMMUNOLOGIC: No evidence of active   bleeding, no abnormal lymphadenopathy. LABORATORY AND IMAGING RESULTS: Lactic acid 2.3, white blood   cell count 11.8, hemoglobin 14.5, platelet count 417. Sodium   160, potassium 4.3, chloride 125, CO2 24, anion gap 11, glucose   241, creatinine 2.4, total bilirubin 1.3. Blood cultures in   progress. Chest x-ray seen and analyzed by me. No evidence of   lung infiltrates, no cardiomegaly. EKG seen and analyzed by me. Normal sinus rhythm, no evidence of acute ischemia.     ASSESSMENT: This is a 68-year-old female patient with a history   of severe dementia and parkinsonism who came into the emergency   room with severe dehydration, acute kidney injury,   hypernatremia, and a possible infection. She is at very high   risk of further complications. She will be admitted to the   hospital and her length of stay is calculated to be more than 2   midnights. PLAN    1. Severe hypernatremia. The patient has been started on 0.2%   normal saline. We will monitor her BMP q.4 hours. Our goal is   going to be to decrease her sodium level in no more than 8 mEq   per day. I believe that her hypernatremia is secondary to   dehydration because the patient has not been eating or drinking   for the last 2 months. 2. Acute kidney injury due to dehydration. She will receive   aggressive hydration with 0.2% normal saline. We will avoid any   nephrotoxic medication. Lisinopril will be held. We will monitor   her kidney function on a regular basis. 3. Worsening mental status with advanced dementia and   parkinsonism. The patient has Alzheimer disease and possibly she   has abnormal movements due to prolonged use of antipsychotics. I   will put on hold her regular dose of Geodon, and she is   extremely hyperactive and agitated, and for that reason, I will   use IV Ativan to try to keep her more comfortable. She will also   receive IV Haldol p.r.n., and I will order IV benztropine 0.5 mg   q.12 hours with the hope that that will help her   extrapyramidalism. 4. History of diabetes type 2. She will receive Humalog sliding   scale. 5. Hypertension. Controlled at this time. I will put on hold her   regular blood pressure medications because she is severely   dehydrated. 6. Deep vein thrombosis prophylaxis. Subcutaneous heparin.         MD Lolita Jordan / Yudith.Asha   D:  05/20/2017   17:58   T:  05/20/2017   22:14   Job #:  [id number]

## 2017-05-21 NOTE — PROGRESS NOTES
Was called in view of worsening hypernatremia. IV fluids were switched to D5W, NG tube was placed to administer 300 cc of free water q6hrs. Free water deficit is about 4.1 ltrs to bring it down to 150. Continue with D5W @ 100 cc/hr.   BMP in AM.

## 2017-05-21 NOTE — PROGRESS NOTES
Problem: Nutrition Deficit  Goal: *Optimize nutritional status  Nutrition:  Nutrition Consult for TF Management. (Dr. Josefina Shabazz)  Malnutrition Screening Tool Assessment has not been completed. Assessment:  Anthropometrics:              Ht - 5'7\", wgt - 91.2 kg (estimated), BMI 31.5 c/w obesity class I, edema - none. Macronutrient Needs:  Estimated calorie needs - 4580-6323 ximena/day (15-20 ximena/kg/day)   Estimated protein needs - 49-73 gm pro/day (0.8-1.2 gm pro/kgIBW/day) (GFR 20 ml/min)  Max CHO/day - 225 gm CHO/day (50% ximena/day)   Fluid/day - 1.4-1.8 liters/day (1 ml/ximena/day)  Intake/Comparative Standards: The patient is currently NPO which meets 0% of her calorie and 0% of her protein needs. Dextrose 5% was infusing at 100 ml/hr, but it has been changed to 0.225 NaCl infusion. Pertinent Labs:              AM glucose 340, BUN 59, creatinine 2.29, sodium 161; SQBS (5/20) 213 and (5/21) 391. Pertinent Medications:              SSI coverage, Vancomycin, Zosyn. GI Function/Activity:              Hypoactive bowel sounds. Diet:              NPO. Food/Nutrition History:              78year old lady with a h/o Alzheimer dementia, CVA and diabetes admitted with ROSANA and AMS. She is severely hypernatremic due to minimal oral intake over an unknown period of time. Diagnosis (Nutrition): Inadequate energy intake related to NPO status as evidenced by the patient being confused and inappropriate for oral intake and require TF for nutrition support. Intervention:  Meals and Snacks: NPO. Enteral Nutrition: Start TF with Glucerna 1.2 @ 10 ml/hr with a 50 ml/hr water flush ml/hr via NGT. Increase TF as tolerated to the goal rate of 50 ml/hr -  1440 calories/day (100% calorie goal), 72 grams protein/day (100% protein goal), 138 grams CHO/day (does not exceed max CHO limit) and 2208 ml water/day (>100% fluid goal). IV Fluid: (TF + water flush) + IVF = 100 ml/hr.   Nutrition-Related Medication Management: Change POC glucose to every 6 hours while on continuous TF. Reeta Cogan.  Emanuel Pepper  [phone]

## 2017-05-21 NOTE — PROGRESS NOTES
Patient is unable to communicate but is thrashing about in bed, twisting from one side to the other and is trying to get out of bed.  Two mg of Haldol adminsitered

## 2017-05-21 NOTE — PROGRESS NOTES
Progress Note    Patient: Jj Morales MRN: [id number]  SSN: [id number]    YOB: 1940  Age: 78 y.o. Sex: female      Admit Date: 5/20/2017    LOS: 1 day     Subjective:     Seen at bedside today. NG tube was readjusted. Started on free water and Tube feedings. Still confused. Objective:     Vitals:    05/21/17 0449 05/21/17 0723 05/21/17 1132 05/21/17 1551   BP: 128/57  125/69 132/86   Pulse: 89 (!) 101 84 (!) 46   Resp: 16 16 16 26   Temp: 98.2 °F (36.8 °C) 98 °F (36.7 °C) 97.9 °F (36.6 °C) 97.6 °F (36.4 °C)   SpO2: 100% 97% 98% 96%   Weight:       Height:            Intake and Output:  Current Shift:    Last three shifts:      Physical Exam:     EYES: PERRLA. EARS, NOSE, MOUTH AND THROAT: There is no evidence of pharyngeal   erythema, edema, or purulent exudates. RESPIRATORY: Clear breath sounds bilaterally. CARDIOVASCULAR: Regular rate and rhythm with no murmurs. GASTROINTESTINAL: Abdomen is soft and nontender with positive   bowel sounds. GENITOURINARY: Unremarkable. MUSCULOSKELETAL: No evidence of trauma. SKIN: No evidence of active skin lesions. NEUROLOGIC: The patient is completely demented, agitated. She is   having chorea with abnormal movement of her head, tongue, arms,   and lower extremities. No evidence of focal motor weakness. PSYCHIATRIC: Demented. HEMATOLOGIC/LYMPHATIC/IMMUNOLOGIC: No evidence of active   bleeding, no abnormal lymphadenopathy. Lab/Data Review:  Lab results reviewed. For significant abnormal values and values requiring intervention, see assessment and plan. Assessment:     Active Problems:    ROSANA (acute kidney injury) ([facility] [address] (5/20/2017)        Plan:     1. Severe hypernatremia.   -Na level this am was 165 ( patient received 1Lt of NS in the ER)  -Started on D5W however her BS became persistently elevated despite the use of insulin.  -switched back to 0.2% NS, NG tube in place, patient is receiving free water   -BMP q6h.  Goal is to decrease Na level NO MORE THAN 6-8mEq in 24h     2. Acute kidney injury due to dehydration. -improving     3. Worsening mental status with advanced dementia and   parkinsonism. The patient has Alzheimer disease and possibly she   has abnormal movements due to prolonged use of antipsychotics. -IV haldol prn   -IV ativan prn   -Klonopin BID     4. History of diabetes type 2. She will receive Humalog sliding   scale. 5. Hypertension. Controlled at this time. I will put on hold her   regular blood pressure medications because she is severely   dehydrated. 6. Deep vein thrombosis prophylaxis.  Subcutaneous heparin.             Signed By: Jon Batista MD     May 21, 2017

## 2017-05-21 NOTE — ROUTINE PROCESS
TRANSFER - IN REPORT:    Verbal report received from [address], RN(name) on Flaco Fitting  being received from ER(unit) for routine progression of care      Report consisted of patients Situation, Background, Assessment and   Recommendations(SBAR). Information from the following report(s) SBAR was reviewed with the receiving nurse. Opportunity for questions and clarification was provided. Assessment completed upon patients arrival to unit and care assumed.

## 2017-05-22 LAB
ANION GAP BLD CALC-SCNC: 11 MMOL/L (ref 7–16)
ANION GAP BLD CALC-SCNC: 12 MMOL/L (ref 7–16)
ANION GAP BLD CALC-SCNC: 8 MMOL/L (ref 7–16)
BACTERIA SPEC CULT: NORMAL
BUN SERPL-MCNC: 40 MG/DL (ref 8–23)
BUN SERPL-MCNC: 42 MG/DL (ref 8–23)
BUN SERPL-MCNC: 44 MG/DL (ref 8–23)
CALCIUM SERPL-MCNC: 8.3 MG/DL (ref 8.3–10.4)
CALCIUM SERPL-MCNC: 8.6 MG/DL (ref 8.3–10.4)
CALCIUM SERPL-MCNC: 8.7 MG/DL (ref 8.3–10.4)
CHLORIDE SERPL-SCNC: 120 MMOL/L (ref 98–107)
CO2 SERPL-SCNC: 20 MMOL/L (ref 21–32)
CO2 SERPL-SCNC: 23 MMOL/L (ref 21–32)
CO2 SERPL-SCNC: 25 MMOL/L (ref 21–32)
CREAT SERPL-MCNC: 1.7 MG/DL (ref 0.6–1)
CREAT SERPL-MCNC: 1.73 MG/DL (ref 0.6–1)
CREAT SERPL-MCNC: 1.75 MG/DL (ref 0.6–1)
ERYTHROCYTE [DISTWIDTH] IN BLOOD BY AUTOMATED COUNT: 14.3 % (ref 11.9–14.6)
ERYTHROCYTE [DISTWIDTH] IN BLOOD BY AUTOMATED COUNT: 14.5 % (ref 11.9–14.6)
GLUCOSE BLD STRIP.AUTO-MCNC: 210 MG/DL (ref 65–100)
GLUCOSE BLD STRIP.AUTO-MCNC: 231 MG/DL (ref 65–100)
GLUCOSE BLD STRIP.AUTO-MCNC: 258 MG/DL (ref 65–100)
GLUCOSE BLD STRIP.AUTO-MCNC: 260 MG/DL (ref 65–100)
GLUCOSE SERPL-MCNC: 227 MG/DL (ref 65–100)
GLUCOSE SERPL-MCNC: 250 MG/DL (ref 65–100)
GLUCOSE SERPL-MCNC: 261 MG/DL (ref 65–100)
HCT VFR BLD AUTO: 35.7 % (ref 35.8–46.3)
HCT VFR BLD AUTO: 37.3 % (ref 35.8–46.3)
HGB BLD-MCNC: 12 G/DL (ref 11.7–15.4)
HGB BLD-MCNC: 12.2 G/DL (ref 11.7–15.4)
MCH RBC QN AUTO: 30.3 PG (ref 26.1–32.9)
MCH RBC QN AUTO: 30.6 PG (ref 26.1–32.9)
MCHC RBC AUTO-ENTMCNC: 32.7 G/DL (ref 31.4–35)
MCHC RBC AUTO-ENTMCNC: 33.6 G/DL (ref 31.4–35)
MCV RBC AUTO: 91.1 FL (ref 79.6–97.8)
MCV RBC AUTO: 92.8 FL (ref 79.6–97.8)
PHOSPHATE SERPL-MCNC: 2.3 MG/DL (ref 2.3–3.7)
PLATELET # BLD AUTO: 102 K/UL (ref 150–450)
PLATELET # BLD AUTO: 130 K/UL (ref 150–450)
PMV BLD AUTO: 12.8 FL (ref 10.8–14.1)
PMV BLD AUTO: 13.3 FL (ref 10.8–14.1)
POTASSIUM SERPL-SCNC: 3.9 MMOL/L (ref 3.5–5.1)
POTASSIUM SERPL-SCNC: 4 MMOL/L (ref 3.5–5.1)
POTASSIUM SERPL-SCNC: 4.3 MMOL/L (ref 3.5–5.1)
RBC # BLD AUTO: 3.92 M/UL (ref 4.05–5.25)
RBC # BLD AUTO: 4.02 M/UL (ref 4.05–5.25)
SERVICE CMNT-IMP: NORMAL
SODIUM SERPL-SCNC: 152 MMOL/L (ref 136–145)
SODIUM SERPL-SCNC: 153 MMOL/L (ref 136–145)
SODIUM SERPL-SCNC: 154 MMOL/L (ref 136–145)
WBC # BLD AUTO: 13.2 K/UL (ref 4.3–11.1)
WBC # BLD AUTO: 14.1 K/UL (ref 4.3–11.1)

## 2017-05-22 PROCEDURE — 87641 MR-STAPH DNA AMP PROBE: CPT | Performed by: INTERNAL MEDICINE

## 2017-05-22 PROCEDURE — 74011250637 HC RX REV CODE- 250/637: Performed by: INTERNAL MEDICINE

## 2017-05-22 PROCEDURE — 82962 GLUCOSE BLOOD TEST: CPT

## 2017-05-22 PROCEDURE — 74011636637 HC RX REV CODE- 636/637: Performed by: INTERNAL MEDICINE

## 2017-05-22 PROCEDURE — 87040 BLOOD CULTURE FOR BACTERIA: CPT | Performed by: INTERNAL MEDICINE

## 2017-05-22 PROCEDURE — 85027 COMPLETE CBC AUTOMATED: CPT | Performed by: INTERNAL MEDICINE

## 2017-05-22 PROCEDURE — 80048 BASIC METABOLIC PNL TOTAL CA: CPT | Performed by: INTERNAL MEDICINE

## 2017-05-22 PROCEDURE — 84100 ASSAY OF PHOSPHORUS: CPT | Performed by: INTERNAL MEDICINE

## 2017-05-22 PROCEDURE — 74011000250 HC RX REV CODE- 250: Performed by: INTERNAL MEDICINE

## 2017-05-22 PROCEDURE — 36415 COLL VENOUS BLD VENIPUNCTURE: CPT | Performed by: INTERNAL MEDICINE

## 2017-05-22 PROCEDURE — 77030018798 HC PMP KT ENTRL FED COVD -A

## 2017-05-22 PROCEDURE — 74011250636 HC RX REV CODE- 250/636: Performed by: INTERNAL MEDICINE

## 2017-05-22 PROCEDURE — 74011000258 HC RX REV CODE- 258: Performed by: INTERNAL MEDICINE

## 2017-05-22 PROCEDURE — 77030008771 HC TU NG SALEM SUMP -A

## 2017-05-22 PROCEDURE — 65270000029 HC RM PRIVATE

## 2017-05-22 RX ORDER — LANOLIN ALCOHOL/MO/W.PET/CERES
400 CREAM (GRAM) TOPICAL DAILY
COMMUNITY

## 2017-05-22 RX ORDER — INSULIN ASPART 100 [IU]/ML
INJECTION, SOLUTION INTRAVENOUS; SUBCUTANEOUS
COMMUNITY

## 2017-05-22 RX ORDER — DIVALPROEX SODIUM 250 MG/1
250 TABLET, DELAYED RELEASE ORAL 2 TIMES DAILY
COMMUNITY

## 2017-05-22 RX ORDER — BENZTROPINE MESYLATE 1 MG/1
0.5 TABLET ORAL 2 TIMES DAILY
Status: DISCONTINUED | OUTPATIENT
Start: 2017-05-22 | End: 2017-05-26 | Stop reason: HOSPADM

## 2017-05-22 RX ORDER — DEXTROSE 40 %
1 GEL (GRAM) ORAL AS NEEDED
COMMUNITY

## 2017-05-22 RX ORDER — MEMANTINE HYDROCHLORIDE 5 MG/1
5 TABLET ORAL DAILY
COMMUNITY
End: 2017-05-26

## 2017-05-22 RX ORDER — CLONAZEPAM 0.5 MG/1
0.5 TABLET ORAL
COMMUNITY

## 2017-05-22 RX ORDER — DONEPEZIL HYDROCHLORIDE 5 MG/1
5 TABLET, FILM COATED ORAL
COMMUNITY

## 2017-05-22 RX ORDER — BENZTROPINE MESYLATE 1 MG/1
1 TABLET ORAL 2 TIMES DAILY
COMMUNITY

## 2017-05-22 RX ORDER — DONEPEZIL HYDROCHLORIDE 5 MG/1
5 TABLET, FILM COATED ORAL
Status: DISCONTINUED | OUTPATIENT
Start: 2017-05-22 | End: 2017-05-26 | Stop reason: HOSPADM

## 2017-05-22 RX ORDER — DIVALPROEX SODIUM 125 MG/1
250 TABLET, DELAYED RELEASE ORAL 2 TIMES DAILY
Status: DISCONTINUED | OUTPATIENT
Start: 2017-05-22 | End: 2017-05-26 | Stop reason: HOSPADM

## 2017-05-22 RX ORDER — MEMANTINE HYDROCHLORIDE 5 MG/1
10 TABLET ORAL DAILY
Status: DISCONTINUED | OUTPATIENT
Start: 2017-05-23 | End: 2017-05-26 | Stop reason: HOSPADM

## 2017-05-22 RX ORDER — MEMANTINE HYDROCHLORIDE 10 MG/1
10 TABLET ORAL
COMMUNITY

## 2017-05-22 RX ADMIN — CLONAZEPAM 1 MG: 1 TABLET ORAL at 16:39

## 2017-05-22 RX ADMIN — PIPERACILLIN SODIUM,TAZOBACTAM SODIUM 3.38 G: 3; .375 INJECTION, POWDER, FOR SOLUTION INTRAVENOUS at 22:14

## 2017-05-22 RX ADMIN — PIPERACILLIN SODIUM,TAZOBACTAM SODIUM 3.38 G: 3; .375 INJECTION, POWDER, FOR SOLUTION INTRAVENOUS at 08:44

## 2017-05-22 RX ADMIN — FAMOTIDINE 20 MG: 10 INJECTION, SOLUTION INTRAVENOUS at 22:14

## 2017-05-22 RX ADMIN — INSULIN DETEMIR 8 UNITS: 100 INJECTION, SOLUTION SUBCUTANEOUS at 21:00

## 2017-05-22 RX ADMIN — INSULIN LISPRO 6 UNITS: 100 INJECTION, SOLUTION INTRAVENOUS; SUBCUTANEOUS at 08:32

## 2017-05-22 RX ADMIN — VANCOMYCIN HYDROCHLORIDE 1500 MG: 10 INJECTION, POWDER, LYOPHILIZED, FOR SOLUTION INTRAVENOUS at 16:39

## 2017-05-22 RX ADMIN — PIPERACILLIN SODIUM,TAZOBACTAM SODIUM 3.38 G: 3; .375 INJECTION, POWDER, FOR SOLUTION INTRAVENOUS at 02:17

## 2017-05-22 RX ADMIN — INSULIN LISPRO 6 UNITS: 100 INJECTION, SOLUTION INTRAVENOUS; SUBCUTANEOUS at 12:15

## 2017-05-22 RX ADMIN — FAMOTIDINE 20 MG: 10 INJECTION, SOLUTION INTRAVENOUS at 00:52

## 2017-05-22 RX ADMIN — PIPERACILLIN SODIUM,TAZOBACTAM SODIUM 3.38 G: 3; .375 INJECTION, POWDER, FOR SOLUTION INTRAVENOUS at 16:39

## 2017-05-22 RX ADMIN — LEVOTHYROXINE SODIUM 75 MCG: 75 TABLET ORAL at 06:06

## 2017-05-22 RX ADMIN — CLONAZEPAM 1 MG: 1 TABLET ORAL at 08:44

## 2017-05-22 RX ADMIN — INSULIN DETEMIR 5 UNITS: 100 INJECTION, SOLUTION SUBCUTANEOUS at 12:14

## 2017-05-22 RX ADMIN — SODIUM CHLORIDE: 234 INJECTION INTRAMUSCULAR; INTRAVENOUS; SUBCUTANEOUS at 08:32

## 2017-05-22 NOTE — PROGRESS NOTES
Pt NJ tube became coiled in pt month. Oriana chery, order placed to replace NJ tube and resume feeding.

## 2017-05-22 NOTE — PROGRESS NOTES
PTA med list reviewed and updated with [facility] records, several discrepancies noted, will notify MD on rounds.

## 2017-05-22 NOTE — PROGRESS NOTES
Progress Note    Patient: Seven Carrington MRN: [id number]  SSN: [id number]    YOB: 1940  Age: 78 y.o. Sex: female      Admit Date: 5/20/2017    LOS: 2 days     Subjective: This is a 77 YO female patient with a PMH of advanced dementia, chorea/parkinsonism possibly due to long term use of antipsychotics, DM type II and hypothyroidism admitted with hypernatremia ( 165) possibly due to dehydration, possible sepsis ( initial BC are positive , Contamination?) , worsening uncontrolled movements and worsening mental status. According to family condition has deteriorated for the last 2 months and she is barely able to eat. Managed with IV hypotonic fluids. NG tube with tube feedings and free water, started on clonopin to try to help abnormal movements, holding geodon, IV antibiotics. Will need PEG tube once her condition is more stable. Objective:     Vitals:    05/21/17 1938 05/21/17 2347 05/22/17 0405 05/22/17 0805   BP: 151/69 117/72 129/80 149/68   Pulse: (!) 43 (!) 49 (!) 54 66   Resp: 26 22 20 20   Temp: 97.7 °F (36.5 °C) 97.8 °F (36.6 °C) 97.8 °F (36.6 °C) 98 °F (36.7 °C)   SpO2: 97% 93%  96%   Weight:       Height:            Intake and Output:  Current Shift:    Last three shifts: 05/20 1901 - 05/22 0700  In: 55   Out: -     Physical Exam:     EYES: PERRLA. EARS, NOSE, MOUTH AND THROAT: There is no evidence of pharyngeal   erythema, edema, or purulent exudates. RESPIRATORY: Clear breath sounds bilaterally. CARDIOVASCULAR: Regular rate and rhythm with no murmurs. GASTROINTESTINAL: Abdomen is soft and nontender with positive   bowel sounds. GENITOURINARY: Unremarkable. MUSCULOSKELETAL: No evidence of trauma. SKIN: No evidence of active skin lesions. NEUROLOGIC: The patient is completely demented, agitated. She is   having chorea with abnormal movement of her head, tongue, arms,   and lower extremities. No evidence of focal motor weakness. PSYCHIATRIC: Demented. HEMATOLOGIC/LYMPHATIC/IMMUNOLOGIC: No evidence of active   bleeding, no abnormal lymphadenopathy. Lab/Data Review:  Lab results reviewed. For significant abnormal values and values requiring intervention, see assessment and plan. Assessment:     Active Problems:    ROSANA (acute kidney injury) ([facility] [address] (5/20/2017)        Plan:     1. Severe hypernatremia. -improving  -on IV 0.22 % NS     2. Acute kidney injury due to dehydration. -improving     3. Worsening mental status with advanced dementia and   parkinsonism. The patient has Alzheimer disease and possibly she   has abnormal movements due to prolonged use of antipsychotics. -IV haldol prn   -IV ativan prn   -Klonopin BID   -holding geodon   -on benztropine   -most likely will need PEG for proper hydration and nutrition. Consult GI once her condition is more stable. 4. History of diabetes type 2. She will receive Humalog sliding   scale.   -levemir BID     5. Hypertension. Controlled at this time. I will put on hold her   regular blood pressure medications because she is  Still   dehydrated. 6. Deep vein thrombosis prophylaxis.  Subcutaneous heparin.             Signed By: Neida Bass MD     May 22, 2017

## 2017-05-22 NOTE — PROGRESS NOTES
SPEECH PATHOLOGY NOTE:    Orders received for bedside swallowing assessment. NG tube and restraints in place. Patient woke up with cueing but was not able to participate with assessment. Non verbal.  Oral aversion; actively turning away from presentation of a small ice chip. Lips are dry but patient would not allow to fully complete oral care turning away each time. Recommend continue NPO with tube feedings until if/when patient can participate with assessment.     Ronald Nam MS, CCC-SLP

## 2017-05-22 NOTE — PROGRESS NOTES
Primary Nurse Catarino Mauricio (Lucrecia Sumner) Katherine Miles and KIRA Zabala performed a dual skin assessment on this patient No impairment noted.  Four black spots not raised noted the left foot and right breast

## 2017-05-23 ENCOUNTER — APPOINTMENT (OUTPATIENT)
Dept: CT IMAGING | Age: 77
DRG: 392 | End: 2017-05-23
Payer: MEDICARE

## 2017-05-23 LAB
ANION GAP BLD CALC-SCNC: 8 MMOL/L (ref 7–16)
BACTERIA SPEC CULT: NORMAL
BUN SERPL-MCNC: 34 MG/DL (ref 8–23)
CALCIUM SERPL-MCNC: 8.4 MG/DL (ref 8.3–10.4)
CHLORIDE SERPL-SCNC: 118 MMOL/L (ref 98–107)
CO2 SERPL-SCNC: 24 MMOL/L (ref 21–32)
CREAT SERPL-MCNC: 1.53 MG/DL (ref 0.6–1)
GLUCOSE BLD STRIP.AUTO-MCNC: 137 MG/DL (ref 65–100)
GLUCOSE BLD STRIP.AUTO-MCNC: 160 MG/DL (ref 65–100)
GLUCOSE BLD STRIP.AUTO-MCNC: 196 MG/DL (ref 65–100)
GLUCOSE BLD STRIP.AUTO-MCNC: 226 MG/DL (ref 65–100)
GLUCOSE BLD STRIP.AUTO-MCNC: 243 MG/DL (ref 65–100)
GLUCOSE SERPL-MCNC: 208 MG/DL (ref 65–100)
POTASSIUM SERPL-SCNC: 3.7 MMOL/L (ref 3.5–5.1)
SERVICE CMNT-IMP: NORMAL
SODIUM SERPL-SCNC: 150 MMOL/L (ref 136–145)

## 2017-05-23 PROCEDURE — 74011636637 HC RX REV CODE- 636/637: Performed by: INTERNAL MEDICINE

## 2017-05-23 PROCEDURE — 36415 COLL VENOUS BLD VENIPUNCTURE: CPT | Performed by: INTERNAL MEDICINE

## 2017-05-23 PROCEDURE — 65270000029 HC RM PRIVATE

## 2017-05-23 PROCEDURE — 74011000258 HC RX REV CODE- 258: Performed by: INTERNAL MEDICINE

## 2017-05-23 PROCEDURE — 74011000250 HC RX REV CODE- 250: Performed by: INTERNAL MEDICINE

## 2017-05-23 PROCEDURE — 74011250636 HC RX REV CODE- 250/636: Performed by: INTERNAL MEDICINE

## 2017-05-23 PROCEDURE — 70450 CT HEAD/BRAIN W/O DYE: CPT

## 2017-05-23 PROCEDURE — 74011250637 HC RX REV CODE- 250/637: Performed by: INTERNAL MEDICINE

## 2017-05-23 PROCEDURE — 77030032490 HC SLV COMPR SCD KNE COVD -B

## 2017-05-23 PROCEDURE — 80048 BASIC METABOLIC PNL TOTAL CA: CPT | Performed by: INTERNAL MEDICINE

## 2017-05-23 RX ADMIN — LEVOTHYROXINE SODIUM 75 MCG: 75 TABLET ORAL at 05:54

## 2017-05-23 RX ADMIN — DIVALPROEX SODIUM 250 MG: 125 TABLET, DELAYED RELEASE ORAL at 08:25

## 2017-05-23 RX ADMIN — SODIUM CHLORIDE: 234 INJECTION INTRAMUSCULAR; INTRAVENOUS; SUBCUTANEOUS at 08:24

## 2017-05-23 RX ADMIN — BENZTROPINE MESYLATE 0.5 MG: 1 TABLET ORAL at 17:48

## 2017-05-23 RX ADMIN — INSULIN DETEMIR 8 UNITS: 100 INJECTION, SOLUTION SUBCUTANEOUS at 22:52

## 2017-05-23 RX ADMIN — INSULIN LISPRO 2 UNITS: 100 INJECTION, SOLUTION INTRAVENOUS; SUBCUTANEOUS at 11:42

## 2017-05-23 RX ADMIN — CLONAZEPAM 1 MG: 1 TABLET ORAL at 08:26

## 2017-05-23 RX ADMIN — DONEPEZIL HYDROCHLORIDE 5 MG: 5 TABLET, FILM COATED ORAL at 22:52

## 2017-05-23 RX ADMIN — INSULIN LISPRO 4 UNITS: 100 INJECTION, SOLUTION INTRAVENOUS; SUBCUTANEOUS at 00:00

## 2017-05-23 RX ADMIN — PIPERACILLIN SODIUM,TAZOBACTAM SODIUM 3.38 G: 3; .375 INJECTION, POWDER, FOR SOLUTION INTRAVENOUS at 08:24

## 2017-05-23 RX ADMIN — DIVALPROEX SODIUM 250 MG: 125 TABLET, DELAYED RELEASE ORAL at 17:48

## 2017-05-23 RX ADMIN — CLONAZEPAM 1 MG: 1 TABLET ORAL at 17:48

## 2017-05-23 RX ADMIN — INSULIN DETEMIR 8 UNITS: 100 INJECTION, SOLUTION SUBCUTANEOUS at 08:25

## 2017-05-23 RX ADMIN — BENZTROPINE MESYLATE 0.5 MG: 1 TABLET ORAL at 08:25

## 2017-05-23 RX ADMIN — MEMANTINE HYDROCHLORIDE 10 MG: 5 TABLET ORAL at 08:25

## 2017-05-23 RX ADMIN — FAMOTIDINE 20 MG: 10 INJECTION, SOLUTION INTRAVENOUS at 22:52

## 2017-05-23 RX ADMIN — INSULIN LISPRO 4 UNITS: 100 INJECTION, SOLUTION INTRAVENOUS; SUBCUTANEOUS at 06:00

## 2017-05-23 RX ADMIN — INSULIN LISPRO 2 UNITS: 100 INJECTION, SOLUTION INTRAVENOUS; SUBCUTANEOUS at 17:33

## 2017-05-23 NOTE — PROGRESS NOTES
Hospitalist Progress Note    2017  Admit Date: 2017  3:27 PM   NAME: London Blair   :  1940   MRN:  [id number]   Attending: Inge Puckett, *  PCP:  Ellie Orourke DO  Treatment Team: Attending Provider: Inge Puckett MD; Utilization Review: Socorro Mitchell RN; Care Manager: RUBEN Haley      SUBJECTIVE:   London Blair is a 78 y.o. female admitted with ROSANA on CKD, dehydration, hypernatremia and possible sepsis as well as worsening uncontrolled movements. Family reported poor intake over the past 2 months. She is on hypotonic saline with improvement in hypernatremia and Cr has returned to baseline.    Today, she is lying in bed and appears comfortable, non-verbal. No family at bedside     Past Medical History:   Diagnosis Date    Alzheimer disease     Anxiety     Arthritis     Asthma     CAD (coronary artery disease)     CHF    Cataract     COPD     DEMENTIA     Diabetes ([address]     GERD (gastroesophageal reflux disease)     H/O seasonal allergies     Hyperlipidemia     Hypertension     Hypothyroidism     Magnesium disorder     Peripheral neuropathy (HCC)     Psychiatric disorder     Psychosis     Schizoaffective disorder ([facility] [address]     Stroke ([facility] [address]     TIA    Tardive dyskinesia      Recent Results (from the past 24 hour(s))   METABOLIC PANEL, BASIC    Collection Time: 17  4:27 PM   Result Value Ref Range    Sodium 153 (H) 136 - 145 mmol/L    Potassium 3.9 3.5 - 5.1 mmol/L    Chloride 120 (H) 98 - 107 mmol/L    CO2 25 21 - 32 mmol/L    Anion gap 8 7 - 16 mmol/L    Glucose 227 (H) 65 - 100 mg/dL    BUN 40 (H) 8 - 23 MG/DL    Creatinine 1.70 (H) 0.6 - 1.0 MG/DL    GFR est AA 38 (L) >60 ml/min/1.73m2    GFR est non-AA 31 (L) >60 ml/min/1.73m2    Calcium 8.3 8.3 - 10.4 MG/DL   MRSA SCREEN - PCR (NASAL)    Collection Time: 17  6:50 PM   Result Value Ref Range    Special Requests: NO SPECIAL REQUESTS      Culture result:        MRSA target DNA is not detected (presumptive not colonized with MRSA)   GLUCOSE, POC    Collection Time: 05/22/17  7:19 PM   Result Value Ref Range    Glucose (POC) 210 (H) 65 - 100 mg/dL   GLUCOSE, POC    Collection Time: 05/22/17  8:10 PM   Result Value Ref Range    Glucose (POC) 231 (H) 65 - 100 mg/dL   GLUCOSE, POC    Collection Time: 05/22/17 11:59 PM   Result Value Ref Range    Glucose (POC) 226 (H) 65 - 100 mg/dL   GLUCOSE, POC    Collection Time: 05/23/17  4:23 AM   Result Value Ref Range    Glucose (POC) 243 (H) 65 - 876 mg/dL   METABOLIC PANEL, BASIC    Collection Time: 05/23/17 10:00 AM   Result Value Ref Range    Sodium 150 (H) 136 - 145 mmol/L    Potassium 3.7 3.5 - 5.1 mmol/L    Chloride 118 (H) 98 - 107 mmol/L    CO2 24 21 - 32 mmol/L    Anion gap 8 7 - 16 mmol/L    Glucose 208 (H) 65 - 100 mg/dL    BUN 34 (H) 8 - 23 MG/DL    Creatinine 1.53 (H) 0.6 - 1.0 MG/DL    GFR est AA 43 (L) >60 ml/min/1.73m2    GFR est non-AA 35 (L) >60 ml/min/1.73m2    Calcium 8.4 8.3 - 10.4 MG/DL   GLUCOSE, POC    Collection Time: 05/23/17 10:47 AM   Result Value Ref Range    Glucose (POC) 196 (H) 65 - 100 mg/dL     Allergies   Allergen Reactions    Zithromax [Azithromycin] Nausea and Vomiting     Current Facility-Administered Medications   Medication Dose Route Frequency Provider Last Rate Last Dose    insulin detemir (LEVEMIR) injection 8 Units  8 Units SubCUTAneous Q12H Devyn Lopez MD   8 Units at 05/23/17 0825    donepezil (ARICEPT) tablet 5 mg  5 mg Oral QHS Michele Mosley MD        benztropine (COGENTIN) tablet 0.5 mg  0.5 mg Oral BID Devyn Lopez MD   0.5 mg at 05/23/17 0825    divalproex DR (DEPAKOTE) tablet 250 mg  250 mg Oral BID Devyn Lopez MD   250 mg at 05/23/17 0825    memantine (NAMENDA) tablet 10 mg  10 mg Oral DAILY Devyn Lopez MD   10 mg at 05/23/17 8010    clonazePAM (KlonoPIN) tablet 1 mg  1 mg Oral BID Devyn Lopez MD   1 mg at 05/23/17 4404    sodium chloride 0.225 % in sterile water 1,000 mL infusion   IntraVENous CONTINUOUS George Schmitz  mL/hr at 17 0824      vancomycin (VANCOCIN) 1500 mg in  ml infusion  1,500 mg IntraVENous Q24H George Schmitz .3 mL/hr at 17 1639 1,500 mg at 17 1639    insulin lispro (HUMALOG) injection   SubCUTAneous Q6H George Schmitz MD   2 Units at 17 1142    famotidine (PF) (PEPCID) injection 20 mg  20 mg IntraVENous Q24H George Schmitz MD   20 mg at 17 2214    levothyroxine (SYNTHROID) tablet 75 mcg  75 mcg Oral ACB George Schmitz MD   75 mcg at 17 0554    ondansetron [facility]) injection 4 mg  4 mg IntraVENous Q6H PRN George Schmitz MD        haloperidol lactate (HALDOL) injection 2 mg  2 mg IntraVENous Q6H PRN George Schmitz MD   2 mg at 17 7686    LORazepam (ATIVAN) injection 2 mg  2 mg IntraVENous Q4H PRN George Schmitz MD        piperacillin-tazobactam (ZOSYN) 3.375 g in 0.9% sodium chloride (MBP/ADV) 100 mL  3.375 g IntraVENous Q8H George Schmitz MD 25 mL/hr at 17 0824 3.375 g at 17 4831       Review of Systems negative with exception of pertinent positives noted above  PHYSICAL EXAM     Visit Vitals    /55 (BP 1 Location: Right arm, BP Patient Position: At rest)    Pulse (!) 58    Temp 96.8 °F (36 °C)    Resp 17    Ht 5' 7\" (1.702 m)    Wt 91.2 kg (201 lb)    SpO2 93%    BMI 31.48 kg/m2      Temp (24hrs), Av.4 °F (36.3 °C), Min:96.8 °F (36 °C), Max:97.9 °F (36.6 °C)    Oxygen Therapy  O2 Sat (%): 93 % (17 1514)  Pulse via Oximetry: 97 beats per minute (17 0435)  O2 Device: Room air (17 1132)  ETCO2 (mmHg): 97 mmHg (17 0435)    Intake/Output Summary (Last 24 hours) at 17 1548  Last data filed at 17 5287   Gross per 24 hour   Intake              140 ml   Output                0 ml   Net              140 ml General: No acute distress    Lungs: CTA  Heart:  RRR  Abdomen: Soft,+BS  Extremities: No edema   Neurologic:  Lethargic, non-verbal, follows simple commands but not consistently     ASSESSMENT      Active Hospital Problems    Diagnosis Date Noted    ROSANA (acute kidney injury) ([facility] [address] 05/20/2017    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    Type II diabetes mellitus ([facility] [address] 11/13/2015    Hypothyroidism 11/13/2015    Debility 11/13/2015    COPD (chronic obstructive pulmonary disease) ([facility] [address] 11/13/2015    CAD (coronary artery disease) 11/13/2015    Alzheimer disease 11/13/2015    Schizoaffective disorder ([facility] [address]      Plan:  Hypernatremia- improved, continue hypotonic saline   ROSANA resolved with Cr back to baseline   Infectious work up negative, will discontinue ABX  Consult GI for PEG placement   Head CT  DVT Prophylaxis: SCD  Plan of Care Discussed with: Supervising MD, daughter/JALEN Lee., PA

## 2017-05-23 NOTE — PROGRESS NOTES
SPEECH PATHOLOGY NOTE:    ST following per orders received for bedside swallowing assessment. NG tube and restraints in place. Patient opened eyes with tactile with cueing but was not able to participate with assessment. Non verbal. Oral aversion remains with patient turning away from ice chip presented to lips, with no lingual response. Tongue thrust noted prior to reflexive swallow; patient appears to have [address] Patient's status remains similar to yesterday: Recommend continue NPO with tube feedings until if/when patient can participate with assessment. SLP with concerns that Advanced Dementia may be impacting interest in PO.      AKASH Nolen, CCC-SLP

## 2017-05-23 NOTE — PROGRESS NOTES
Problem: Nutrition Deficit  Goal: *Optimize nutritional status  Nutrition:  TF Management. (Dr. Marcin Amador)  Malnutrition Screening Tool Assessment is NOW complete and patient with unsure weight loss and poor appetite. Assessment:  Anthropometrics:  Ht - 5'7\", wgt - 91.2 kg (estimated), BMI 31.5 c/w obesity class I, edema - none. RD ordered weight to accurately assess weight loss now that unsure weight loss has been reported. Macronutrient Needs:  Estimated calorie needs - [id number] ximena/day (15-20 ximena/kg/day)   Estimated protein needs - 49-73 gm pro/day (0.8-1.2 gm pro/kgIBW/day) (GFR 20 ml/min)  Max CHO/day - 225 gm CHO/day (50% ximena/day)   Fluid/day - 1.4-1.8 liters/day (1 ml/ximena/day)  Intake/Comparative Standards:  Current TF of Glucerna 1.2 @ 30ml/hr  is providin calories/day (64% calorie goal), 43 grams protein/day (88% protein goal), 83 grams CHO/day (does not exceed max CHO limit) and 1805 ml water/day (>100% fluid goal). Pertinent Labs:  AM glucose 227, BUN 40, creatinine 1.7, sodium 153; SQBS () 210-258 and () 243. Pertinent Medications:  SSI coverage, Vancomycin, Zosyn, levemir 8 units/day, IVF: 0.225% sodium at 100ml/hr  GI Function/Activity:  Hypoactive bowel sounds. No BM recorded yet. Diet:  NPO. Food/Nutrition History:  Patient continues to be followed by SLP and remains inappropriate for po d/t mental status. Patient is having difficulty with TF being advanced. Per Tiesha Stovall RN she has been unable to advance the patient past 30ml/hr. NG tube became coiled in mouth and was replaced yesterday. Residuals have been minimal until this morning the patient had a residual of 150ml. Intervention:  Meals and Snacks: NPO per SLP. Enteral Nutrition: Continue to advance Glucerna 1.2 as tolerate to 50ml/hr with a 50 ml/hr water flush ml/hr via NGT.  - 1440 calories/day (100% calorie goal), 72 grams protein/day (100% protein goal), 138 grams CHO/day (does not exceed max CHO limit) and 2208 ml water/day (>100% fluid goal). IV Fluid: Per MD Rachel George.  Genaro Gorman [place] [address], [address],  [phone]

## 2017-05-23 NOTE — PROGRESS NOTES
NG tube replaced by Grayson De La Garza RN, without difficulty. Placement checked and tube feed begun. Will monitor  per protocol.

## 2017-05-24 ENCOUNTER — ANESTHESIA EVENT (OUTPATIENT)
Dept: ENDOSCOPY | Age: 77
DRG: 392 | End: 2017-05-24
Payer: MEDICARE

## 2017-05-24 LAB
ANION GAP BLD CALC-SCNC: 13 MMOL/L (ref 7–16)
ANION GAP BLD CALC-SCNC: 13 MMOL/L (ref 7–16)
BUN SERPL-MCNC: 21 MG/DL (ref 8–23)
BUN SERPL-MCNC: 22 MG/DL (ref 8–23)
CALCIUM SERPL-MCNC: 8.4 MG/DL (ref 8.3–10.4)
CALCIUM SERPL-MCNC: 8.6 MG/DL (ref 8.3–10.4)
CHLORIDE SERPL-SCNC: 116 MMOL/L (ref 98–107)
CHLORIDE SERPL-SCNC: 118 MMOL/L (ref 98–107)
CO2 SERPL-SCNC: 21 MMOL/L (ref 21–32)
CO2 SERPL-SCNC: 22 MMOL/L (ref 21–32)
CREAT SERPL-MCNC: 1.1 MG/DL (ref 0.6–1)
CREAT SERPL-MCNC: 1.15 MG/DL (ref 0.6–1)
GLUCOSE BLD STRIP.AUTO-MCNC: 144 MG/DL (ref 65–100)
GLUCOSE BLD STRIP.AUTO-MCNC: 155 MG/DL (ref 65–100)
GLUCOSE BLD STRIP.AUTO-MCNC: 178 MG/DL (ref 65–100)
GLUCOSE BLD STRIP.AUTO-MCNC: 183 MG/DL (ref 65–100)
GLUCOSE SERPL-MCNC: 140 MG/DL (ref 65–100)
GLUCOSE SERPL-MCNC: 170 MG/DL (ref 65–100)
POTASSIUM SERPL-SCNC: 3.8 MMOL/L (ref 3.5–5.1)
POTASSIUM SERPL-SCNC: 4.3 MMOL/L (ref 3.5–5.1)
SODIUM SERPL-SCNC: 151 MMOL/L (ref 136–145)
SODIUM SERPL-SCNC: 152 MMOL/L (ref 136–145)

## 2017-05-24 PROCEDURE — 65270000029 HC RM PRIVATE

## 2017-05-24 PROCEDURE — 36415 COLL VENOUS BLD VENIPUNCTURE: CPT | Performed by: INTERNAL MEDICINE

## 2017-05-24 PROCEDURE — 74011250636 HC RX REV CODE- 250/636: Performed by: INTERNAL MEDICINE

## 2017-05-24 PROCEDURE — 74011250637 HC RX REV CODE- 250/637: Performed by: INTERNAL MEDICINE

## 2017-05-24 PROCEDURE — 80048 BASIC METABOLIC PNL TOTAL CA: CPT | Performed by: INTERNAL MEDICINE

## 2017-05-24 PROCEDURE — 74011000250 HC RX REV CODE- 250: Performed by: PHYSICIAN ASSISTANT

## 2017-05-24 PROCEDURE — 74011636637 HC RX REV CODE- 636/637: Performed by: INTERNAL MEDICINE

## 2017-05-24 PROCEDURE — 82962 GLUCOSE BLOOD TEST: CPT

## 2017-05-24 PROCEDURE — 77030027688 HC DRSG MEPILEX 16-48IN NO BORD MOLN -A

## 2017-05-24 RX ORDER — METRONIDAZOLE 500 MG/100ML
500 INJECTION, SOLUTION INTRAVENOUS ONCE
Status: COMPLETED | OUTPATIENT
Start: 2017-05-24 | End: 2017-05-24

## 2017-05-24 RX ORDER — SODIUM CHLORIDE, SODIUM LACTATE, POTASSIUM CHLORIDE, CALCIUM CHLORIDE 600; 310; 30; 20 MG/100ML; MG/100ML; MG/100ML; MG/100ML
75 INJECTION, SOLUTION INTRAVENOUS CONTINUOUS
Status: CANCELLED | OUTPATIENT
Start: 2017-05-24 | End: 2017-05-25

## 2017-05-24 RX ORDER — FACIAL-BODY WIPES
10 EACH TOPICAL ONCE
Status: COMPLETED | OUTPATIENT
Start: 2017-05-24 | End: 2017-05-24

## 2017-05-24 RX ORDER — DEXTROSE MONOHYDRATE 50 MG/ML
75 INJECTION, SOLUTION INTRAVENOUS CONTINUOUS
Status: DISCONTINUED | OUTPATIENT
Start: 2017-05-24 | End: 2017-05-26 | Stop reason: HOSPADM

## 2017-05-24 RX ORDER — CEFAZOLIN SODIUM IN 0.9 % NACL 2 G/50 ML
2 INTRAVENOUS SOLUTION, PIGGYBACK (ML) INTRAVENOUS
Status: COMPLETED | OUTPATIENT
Start: 2017-05-24 | End: 2017-05-25

## 2017-05-24 RX ADMIN — INSULIN LISPRO 2 UNITS: 100 INJECTION, SOLUTION INTRAVENOUS; SUBCUTANEOUS at 11:56

## 2017-05-24 RX ADMIN — INSULIN LISPRO 2 UNITS: 100 INJECTION, SOLUTION INTRAVENOUS; SUBCUTANEOUS at 05:30

## 2017-05-24 RX ADMIN — CLONAZEPAM 1 MG: 1 TABLET ORAL at 09:00

## 2017-05-24 RX ADMIN — CLONAZEPAM 1 MG: 1 TABLET ORAL at 17:59

## 2017-05-24 RX ADMIN — DIVALPROEX SODIUM 250 MG: 125 TABLET, DELAYED RELEASE ORAL at 17:59

## 2017-05-24 RX ADMIN — INSULIN DETEMIR 8 UNITS: 100 INJECTION, SOLUTION SUBCUTANEOUS at 09:34

## 2017-05-24 RX ADMIN — BISACODYL 10 MG: 10 SUPPOSITORY RECTAL at 16:23

## 2017-05-24 RX ADMIN — BENZTROPINE MESYLATE 0.5 MG: 1 TABLET ORAL at 17:59

## 2017-05-24 RX ADMIN — DIVALPROEX SODIUM 250 MG: 125 TABLET, DELAYED RELEASE ORAL at 09:00

## 2017-05-24 RX ADMIN — DEXTROSE MONOHYDRATE 75 ML/HR: 5 INJECTION, SOLUTION INTRAVENOUS at 16:30

## 2017-05-24 RX ADMIN — METRONIDAZOLE 500 MG: 500 INJECTION, SOLUTION INTRAVENOUS at 16:23

## 2017-05-24 RX ADMIN — BENZTROPINE MESYLATE 0.5 MG: 1 TABLET ORAL at 09:00

## 2017-05-24 RX ADMIN — MEMANTINE HYDROCHLORIDE 10 MG: 5 TABLET ORAL at 09:00

## 2017-05-24 NOTE — PROGRESS NOTES
Hospitalist Progress Note    2017  Admit Date: 2017  3:27 PM   NAME: Dru Mcallister   :  1940   MRN:  [id number]   Attending: Shantanu Vila, *  PCP:  Akash Seo DO    SUBJECTIVE:     Pt is a 79 yo female admitted  with ROSANA on CKD, hypernatremia, and possible sepsis. Pt with very poor PO intake. Cr has improved to baseline. Na has had moderate improvement with 0.225 NS but still remains elevated at 152. Abx have been d/serjio due to negative infectious work up. Pt has been seen by speech therapy who states no oral readiness or interest.  GI has been consulted and pt is determined candidate for PEG tube. CT head neg for acute findinds but does show chronic lacunar infarct. On my exam pt is awake but does not answer any questions appropriately. Unable to obtain ROS due to advanced dementia. No family present. I have spoken to Alvino Gomez, on the phone and she has been updated on plan of care. She is requesting PEG tube placement. Review of Systems negative with exception of pertinent positives noted above  PHYSICAL EXAM     Visit Vitals    /76 (BP 1 Location: Right arm, BP Patient Position: At rest)    Pulse 69    Temp 97.7 °F (36.5 °C)    Resp 16    Ht 5' 7\" (1.702 m)    Wt 91.2 kg (201 lb)    SpO2 97%    BMI 31.48 kg/m2      Temp (24hrs), Av.5 °F (36.4 °C), Min:96.8 °F (36 °C), Max:97.8 °F (36.6 °C)    Oxygen Therapy  O2 Sat (%): 97 % (17 1445)  Pulse via Oximetry: 92 beats per minute (17 0524)  O2 Device: Room air (17 1132)  ETCO2 (mmHg): 97 mmHg (17 0435)    Intake/Output Summary (Last 24 hours) at 17 1451  Last data filed at 17 0530   Gross per 24 hour   Intake              200 ml   Output                0 ml   Net              200 ml      General: Awake, rhythmic oral motions, makes eye contact but nonverbal   Lungs:  CTA Bilaterally.    Heart:  Regular rate and rhythm,  No murmur, rub, or gallop  Abdomen: Soft, Non distended, Non tender, Positive bowel sounds  Extremities: No cyanosis, clubbing or edema  Neurologic:  No focal deficits    ASSESSMENT      Active Hospital Problems    Diagnosis Date Noted    ROSANA (acute kidney injury) ([facility] 05/20/2017    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    Type II diabetes mellitus ([facility] 11/13/2015    Hypothyroidism 11/13/2015    Debility 11/13/2015    COPD (chronic obstructive pulmonary disease) ([facility] 11/13/2015    CAD (coronary artery disease) 11/13/2015    Alzheimer disease 11/13/2015    Schizoaffective disorder (HCC)      A/P:    Dysphagia, dehydration- Possible PEG placement tomorrow per GI    Hypernatremia- Cont 50 ml/hr free water per NG tube. Switch IVF to D5W @ 75. Noted pt will not receive free water per NG after MN. Repeat labs in am.      ROSANA on CKD- Resolved. Cr back to baseline 1. 10. DM 2- Chronic. Levemer at half dose due to NPO p MN for PEG. Cont SSI q 6 hrs. Schizoaffective disorder, advanced dementia- Cont current regimen of Klonopin, Cogentin, Depakote, Aricept, Namenda, PRN Haldol. Jae monroe/serjio. DC planning- SW following. Pt with long term bed hold until 05/29.       DVT Prophylaxis: SCDs    Signed By: Alejandra July, NP     May 24, 2017

## 2017-05-24 NOTE — PROGRESS NOTES
SPEECH PATHOLOGY NOTE:    ST following per orders received for bedside swallowing assessment. NG tube and restraints in place. Patient is nonverbal. She briefly opens her eyes with tactile stimulation. She presses her lips together and turns her head away with all oral stimulation, including touch, ice chip to lips, straw pipette toward mouth, and sponge toothette for oral care. Tongue thrust noted prior to reflexive swallow; patient appears to have [address]. Patient demonstrates no oral readiness nor interest in PO, likely secondary to patient's Advanced Dementia. Recommend NPO with alternative nutrition/hydration. Third attempt this date; Therefore, Speech Therapy to discontinue orders at this time. Please re-consult if/when patient appears ready for PO trials.      AKASH Pulido, CCC-SLP

## 2017-05-24 NOTE — CONSULTS
[facility] Consult Note       Consulting GI Physician: Dr. Johnson Form    Referring Physician:  Michelet Fowler PA-C/Dr. Ileana Kraus    Consult Date:  5/24/2017    Admit Date:  5/20/2017    Chief Complaint:  Evaluation for PEG tube placement    Subjective:     History of Present Illness:  Patient is a 78 y.o. female with PMH significant for CAD, COPD, TIA, DM, Dementia, and otherwise as outlined below, who is seen in consultation at the request of Dr. Ileana Kraus for PEG tube placement evaluation. She was admitted 5/20/17 with ROSANA on CKD, dehydration, hypernatremia and possible sepsis, and worsening uncontrolled movements. Her hypernatremia is improved and Cr has returned to baseline. Infectious work up was negative and antibiotic therapy was discontinued 5/23/17. CT head was neg for any acute findings. Throughout her hospital course here it appears that family reported to the primary team that pt has had a poor dietary intake which has been ongoing at least over the past 1month. Speech has been following pt for at least the past few days and note that patient demonstrates no oral readiness nor interest in PO, likely secondary to her Advanced Dementia. Speech has placed recommends for NPO with alternative nutrition/hydration. Pt currently has NGT in place with tube feeds at 50ml/hr. Family reports prior placement of a PEG tube at least 9-10yrs ago when pt was living in [address]- then pulled out by pt. They are interested in placement of PEG feeding tube at this time for improved nutritional support. There are no known recent GI complaints and her daughter confirms this via telephone conversation however this is difficult to evaluate secondary to pt nonverbal status. There are no known significant abdominal surgeries in the past however pt daughter reports other than history on pt medical chart history is unknown. Pt is not on any blood thinners.       PMH:  Past Medical History:   Diagnosis Date    Alzheimer disease     Anxiety     Arthritis     Asthma     CAD (coronary artery disease)     CHF    Cataract     COPD     DEMENTIA     [facility]     GERD (gastroesophageal reflux disease)     H/O seasonal allergies     Hyperlipidemia     Hypertension     Hypothyroidism     Magnesium disorder     Peripheral neuropathy (HCC)     Psychiatric disorder     Psychosis     [facility]oaffect[facility] disorder ([facility]     Stroke ([facility]     TIA    Tardive dyskinesia        PSH:  Past Surgical History:   Procedure Laterality Date    HX CHOLECYSTECTOMY      feeding tube placed and removed    HX TOTAL ABDOMINAL HYSTERECTOMY      unsure when or for what reason       Allergies: Allergies   Allergen Reactions    Zithromax [Azithromycin] Nausea and Vomiting       Home Medications:  Prior to Admission medications    Medication Sig Start Date End Date Taking? Authorizing Provider   donepezil (ARICEPT) 5 mg tablet Take 5 mg by mouth nightly. Yes Historical Provider   benztropine (COGENTIN) 1 mg tablet Take 1 mg by mouth two (2) times a day. Yes Historical Provider   clonazePAM (KLONOPIN) 0.5 mg tablet Take 0.5 mg by mouth nightly. Yes Historical Provider   divalproex DR (DEPAKOTE) 250 mg tablet Take 250 mg by mouth two (2) times a day. Yes Historical Provider   glucagon (GLUCAGEN) 1 mg injection 1 mg by IntraVENous route as needed. For blood sugar less than 60 and unresponsive   Yes Historical Provider   dextrose 40% (GLUCOSE GEL) 40 % oral gel Take 1 Tube by mouth as needed for Hypoglycemia (if blood sugar less than 60 and responsive). Yes Historical Provider   insulin detemir (LEVEMIR) 100 unit/mL injection 7 Units by SubCUTAneous route every twelve (12) hours. Yes Historical Provider   magnesium oxide (MAG-OX) 400 mg tablet Take 400 mg by mouth daily. Yes Historical Provider   memantine (NAMENDA) 10 mg tablet Take 10 mg by mouth nightly.    Yes Historical Provider   memantine (NAMENDA) 5 mg tablet Take 5 mg by mouth daily. Yes Historical Provider   insulin aspart (NOVOLOG) 100 unit/mL injection by SubCUTAneous route. Sliding scale  151-200=2units  201-250=4units  251-300=6units  301-350=8units  351-400=10units  401-450=12units  451 and above 15units   Yes Historical Provider   ziprasidone (GEODON) 40 mg capsule Take 40 mg by mouth two (2) times daily (with meals). Historical Provider   polyethylene glycol (MIRALAX) 17 gram packet Take 17 g by mouth daily. Indications: CONSTIPATION    Historical Provider   levothyroxine (SYNTHROID) 75 mcg tablet Take 75 mcg by mouth daily.     Historical Provider       Hospital Medications:  Current Facility-Administered Medications   Medication Dose Route Frequency    insulin detemir (LEVEMIR) injection 8 Units  8 Units SubCUTAneous Q12H    donepezil (ARICEPT) tablet 5 mg  5 mg Oral QHS    benztropine (COGENTIN) tablet 0.5 mg  0.5 mg Oral BID    divalproex DR (DEPAKOTE) tablet 250 mg  250 mg Oral BID    memantine (NAMENDA) tablet 10 mg  10 mg Oral DAILY    clonazePAM (KlonoPIN) tablet 1 mg  1 mg Oral BID    sodium chloride 0.225 % in sterile water 1,000 mL infusion   IntraVENous CONTINUOUS    insulin lispro (HUMALOG) injection   SubCUTAneous Q6H    famotidine (PF) (PEPCID) injection 20 mg  20 mg IntraVENous Q24H    levothyroxine (SYNTHROID) tablet 75 mcg  75 mcg Oral ACB    ondansetron (ZOFRAN) injection 4 mg  4 mg IntraVENous Q6H PRN    haloperidol lactate (HALDOL) injection 2 mg  2 mg IntraVENous Q6H PRN    LORazepam (ATIVAN) injection 2 mg  2 mg IntraVENous Q4H PRN       Social History:  Social History   Substance Use Topics    Smoking status: Never Smoker    Smokeless tobacco: Not on file    Alcohol use No       Family History:  Family History   Problem Relation Age of Onset    Depression Mother     Arthritis-osteo Mother     Thyroid Disease Mother     Heart Attack Mother     Hypertension Mother        Review of Systems:  A detailed 10 system ROS is obtained, with pertinent positives as listed above. Difficult to obtain secondary to pt nonverbal status    Diet:  NGT with tube feeds    Objective:     Physical Exam:  Vitals:  Visit Vitals    /71 (BP 1 Location: Right arm, BP Patient Position: At rest)    Pulse 60    Temp 97.2 °F (36.2 °C)    Resp 16    Ht 5' 7\" (1.702 m)    Wt 91.2 kg (201 lb)    SpO2 95%    BMI 31.48 kg/m2     Gen:  Pt is nonverbal.  Eyes open periodically to stimuli. No family present at bedside. Skin:  Face reveal no rashes. HEENT: Sclerae anicteric. NGT in place in left nare  Cardiovascular: Regular rate and rhythm. Respiratory:  Comfortable breathing with no accessory muscle use. Clear breath sounds anteriorly with no wheezes, rales, or rhonchi. GI:  Abdomen Nondistended, Soft, and Nontender. Normal active bowel sounds. No masses palpable.   Rectal:  Deferred    Laboratory:    Recent Labs      05/24/17   0744  05/23/17   1000  05/22/17   1627  05/22/17   1218  05/22/17   0530  05/21/17   2210  05/21/17   1905   WBC   --    --    --   13.2*  14.1*   --    --    HGB   --    --    --   12.0  12.2   --    --    HCT   --    --    --   35.7*  37.3   --    --    PLT   --    --    --   102*  130*   --    --    MCV   --    --    --   91.1  92.8   --    --    NA  151*  150*  153*  152*  154*  156*  159*   K  3.8  3.7  3.9  4.3  4.0  4.2  4.3   CL  116*  118*  120*  120*  120*  124*  125*   CO2  22  24  25  20*  23  20*  19*   BUN  22  34*  40*  42*  44*  49*  51*   CREA  1.15*  1.53*  1.70*  1.73*  1.75*  1.90*  2.06*   CA  8.6  8.4  8.3  8.7  8.6  8.8  8.7   GLU  140*  208*  227*  261*  250*  154*  105*          Assessment:     Principal Problem:    ROSANA (acute kidney injury) ([facility] (5/20/2017)    Active Problems:    CAD (coronary artery disease) (11/13/2015)      COPD (chronic obstructive pulmonary disease) ([place] [address] (11/13/2015)      Type II diabetes mellitus ([place] [address] (11/13/2015)      Hypothyroidism (11/13/2015)      Alzheimer disease (11/13/2015)      Psychogenic paranoid psychosis (11/13/2015)      Debility (11/13/2015)      Schizoaffective disorder [facility]) ()       78 y.o. female with PMH significant for CAD, COPD, TIA, DM, Dementia, and otherwise as outlined below, who is seen in consultation at the request of Dr. Juliette Rosen for PEG tube placement evaluation due to poor dietary intake x1mo per pt family. She was admitted 5/20/17 with ROSANA on CKD, dehydration, hypernatremia and possible sepsis, and worsening uncontrolled movements with neg CT head and now improving. Speech has been following and note pt with no oral readiness nor interest in PO, likely secondary to her Advanced Dementia. Speech has placed recommendations for NPO with alternative nutrition/hydration. Pt currently has NGT in place with tube feeds at 50ml/hr. Plan:     - Pt would benefit from placement of PEG feeding tube. Discussed risks/benefits of placement of PEG tube with pt daughter/POA Rogelio Carreon [phone]) and she expressed interest for PEG placement from her as well as from her other family. ? Plan for EGD with peg sometime tomorrow. Will review plan with Dr. Matt Angelo. In the meantime continue NGT with tube feeds. Will follow. Nelly Beckwith PA-C    Patient is seen and examined in collaboration with Dr. Sarita Dowling. Assessment and plan as per Dr. Sarita Dowling.

## 2017-05-25 ENCOUNTER — ANESTHESIA (OUTPATIENT)
Dept: ENDOSCOPY | Age: 77
DRG: 392 | End: 2017-05-25
Payer: MEDICARE

## 2017-05-25 LAB
BACTERIA SPEC CULT: ABNORMAL
BACTERIA SPEC CULT: ABNORMAL
BACTERIA SPEC CULT: NORMAL
GLUCOSE BLD STRIP.AUTO-MCNC: 167 MG/DL (ref 65–100)
GLUCOSE BLD STRIP.AUTO-MCNC: 202 MG/DL (ref 65–100)
GRAM STN SPEC: ABNORMAL
SERVICE CMNT-IMP: ABNORMAL
SERVICE CMNT-IMP: NORMAL

## 2017-05-25 PROCEDURE — 74011000250 HC RX REV CODE- 250

## 2017-05-25 PROCEDURE — 76937 US GUIDE VASCULAR ACCESS: CPT

## 2017-05-25 PROCEDURE — C9113 INJ PANTOPRAZOLE SODIUM, VIA: HCPCS | Performed by: INTERNAL MEDICINE

## 2017-05-25 PROCEDURE — 82962 GLUCOSE BLOOD TEST: CPT

## 2017-05-25 PROCEDURE — 3E0G76Z INTRODUCTION OF NUTRITIONAL SUBSTANCE INTO UPPER GI, VIA NATURAL OR ARTIFICIAL OPENING: ICD-10-PCS | Performed by: INTERNAL MEDICINE

## 2017-05-25 PROCEDURE — 0DH63UZ INSERTION OF FEEDING DEVICE INTO STOMACH, PERCUTANEOUS APPROACH: ICD-10-PCS | Performed by: INTERNAL MEDICINE

## 2017-05-25 PROCEDURE — 65270000029 HC RM PRIVATE

## 2017-05-25 PROCEDURE — 76060000032 HC ANESTHESIA 0.5 TO 1 HR: Performed by: INTERNAL MEDICINE

## 2017-05-25 PROCEDURE — 74011250636 HC RX REV CODE- 250/636: Performed by: INTERNAL MEDICINE

## 2017-05-25 PROCEDURE — 74011000250 HC RX REV CODE- 250: Performed by: INTERNAL MEDICINE

## 2017-05-25 PROCEDURE — 77030005122 HC CATH GASTMY PEG BSC -B: Performed by: INTERNAL MEDICINE

## 2017-05-25 PROCEDURE — 74011636637 HC RX REV CODE- 636/637: Performed by: INTERNAL MEDICINE

## 2017-05-25 PROCEDURE — 74011250636 HC RX REV CODE- 250/636

## 2017-05-25 PROCEDURE — 74011250637 HC RX REV CODE- 250/637: Performed by: INTERNAL MEDICINE

## 2017-05-25 PROCEDURE — 76040000025: Performed by: INTERNAL MEDICINE

## 2017-05-25 RX ORDER — LIDOCAINE HYDROCHLORIDE 20 MG/ML
INJECTION, SOLUTION EPIDURAL; INFILTRATION; INTRACAUDAL; PERINEURAL AS NEEDED
Status: DISCONTINUED | OUTPATIENT
Start: 2017-05-25 | End: 2017-05-25 | Stop reason: HOSPADM

## 2017-05-25 RX ORDER — METRONIDAZOLE 500 MG/100ML
500 INJECTION, SOLUTION INTRAVENOUS ONCE
Status: COMPLETED | OUTPATIENT
Start: 2017-05-25 | End: 2017-05-25

## 2017-05-25 RX ORDER — FAMOTIDINE 10 MG/ML
20 INJECTION INTRAVENOUS ONCE
Status: COMPLETED | OUTPATIENT
Start: 2017-05-25 | End: 2017-05-25

## 2017-05-25 RX ORDER — SODIUM CHLORIDE, SODIUM LACTATE, POTASSIUM CHLORIDE, CALCIUM CHLORIDE 600; 310; 30; 20 MG/100ML; MG/100ML; MG/100ML; MG/100ML
INJECTION, SOLUTION INTRAVENOUS
Status: DISCONTINUED | OUTPATIENT
Start: 2017-05-25 | End: 2017-05-25 | Stop reason: HOSPADM

## 2017-05-25 RX ORDER — PROPOFOL 10 MG/ML
INJECTION, EMULSION INTRAVENOUS AS NEEDED
Status: DISCONTINUED | OUTPATIENT
Start: 2017-05-25 | End: 2017-05-25 | Stop reason: HOSPADM

## 2017-05-25 RX ORDER — PROPOFOL 10 MG/ML
INJECTION, EMULSION INTRAVENOUS
Status: DISCONTINUED | OUTPATIENT
Start: 2017-05-25 | End: 2017-05-25 | Stop reason: HOSPADM

## 2017-05-25 RX ADMIN — INSULIN LISPRO 4 UNITS: 100 INJECTION, SOLUTION INTRAVENOUS; SUBCUTANEOUS at 07:51

## 2017-05-25 RX ADMIN — INSULIN LISPRO 2 UNITS: 100 INJECTION, SOLUTION INTRAVENOUS; SUBCUTANEOUS at 00:00

## 2017-05-25 RX ADMIN — DONEPEZIL HYDROCHLORIDE 5 MG: 5 TABLET, FILM COATED ORAL at 22:05

## 2017-05-25 RX ADMIN — INSULIN DETEMIR 4 UNITS: 100 INJECTION, SOLUTION SUBCUTANEOUS at 22:06

## 2017-05-25 RX ADMIN — BENZTROPINE MESYLATE 0.5 MG: 1 TABLET ORAL at 17:59

## 2017-05-25 RX ADMIN — CLONAZEPAM 1 MG: 1 TABLET ORAL at 17:58

## 2017-05-25 RX ADMIN — INSULIN LISPRO 4 UNITS: 100 INJECTION, SOLUTION INTRAVENOUS; SUBCUTANEOUS at 18:00

## 2017-05-25 RX ADMIN — CLONAZEPAM 1 MG: 1 TABLET ORAL at 10:16

## 2017-05-25 RX ADMIN — BENZTROPINE MESYLATE 0.5 MG: 1 TABLET ORAL at 10:16

## 2017-05-25 RX ADMIN — METRONIDAZOLE 500 MG: 500 INJECTION, SOLUTION INTRAVENOUS at 12:20

## 2017-05-25 RX ADMIN — SODIUM CHLORIDE 40 MG: 9 INJECTION, SOLUTION INTRAMUSCULAR; INTRAVENOUS; SUBCUTANEOUS at 14:38

## 2017-05-25 RX ADMIN — LIDOCAINE HYDROCHLORIDE 40 MG: 20 INJECTION, SOLUTION EPIDURAL; INFILTRATION; INTRACAUDAL; PERINEURAL at 12:46

## 2017-05-25 RX ADMIN — MEMANTINE HYDROCHLORIDE 10 MG: 5 TABLET ORAL at 10:15

## 2017-05-25 RX ADMIN — LEVOTHYROXINE SODIUM 75 MCG: 75 TABLET ORAL at 06:02

## 2017-05-25 RX ADMIN — SODIUM CHLORIDE, SODIUM LACTATE, POTASSIUM CHLORIDE, CALCIUM CHLORIDE: 600; 310; 30; 20 INJECTION, SOLUTION INTRAVENOUS at 12:30

## 2017-05-25 RX ADMIN — PROPOFOL 50 MCG/KG/MIN: 10 INJECTION, EMULSION INTRAVENOUS at 12:46

## 2017-05-25 RX ADMIN — CEFAZOLIN 2 G: 1 INJECTION, POWDER, FOR SOLUTION INTRAMUSCULAR; INTRAVENOUS; PARENTERAL at 12:31

## 2017-05-25 RX ADMIN — FAMOTIDINE 20 MG: 10 INJECTION, SOLUTION INTRAVENOUS at 12:20

## 2017-05-25 RX ADMIN — PROPOFOL 30 MG: 10 INJECTION, EMULSION INTRAVENOUS at 12:46

## 2017-05-25 RX ADMIN — SODIUM CHLORIDE 40 MG: 9 INJECTION, SOLUTION INTRAMUSCULAR; INTRAVENOUS; SUBCUTANEOUS at 22:05

## 2017-05-25 NOTE — PROGRESS NOTES
TRANSFER - IN REPORT:    Verbal report received from Johnny David RN (name) on Renu Reese  being received from room 611 (unit) for ordered procedure. Report consisted of patients Situation, Background, Assessment and   Recommendations(SBAR). Information from the following report(s) SBAR was reviewed with the receiving nurse. Opportunity for questions and clarification was provided. Awaiting transport to bring pt to the GI Lab at this time.

## 2017-05-25 NOTE — ANESTHESIA POSTPROCEDURE EVALUATION
Post-Anesthesia Evaluation and Assessment    Patient: Leslie Espinoza MRN: [id number]  SSN: [id number]    YOB: 1940  Age: 78 y.o. Sex: female       Cardiovascular Function/Vital Signs  Visit Vitals    /76    Pulse (!) 58    Temp 36.6 °C (97.8 °F)    Resp 20    Ht 5' 7\" (1.702 m)    Wt 91.2 kg (201 lb)    SpO2 98%    BMI 31.48 kg/m2       Patient is status post total IV anesthesia anesthesia for Procedure(s):  PERCUTANEOUS ENDOSCOPIC GASTROSTOMY TUBE INSERTION/ BMI=31 / ROOM 611  ESOPHAGOGASTRODUODENOSCOPY (EGD). Nausea/Vomiting: None    Postoperative hydration reviewed and adequate. Pain:  Pain Scale 1: Visual (05/25/17 1424)  Pain Intensity 1: 0 (05/25/17 1424)   Managed    Neurological Status:   Neuro  Neurologic State: Confused;Drowsy (05/25/17 8384)  Orientation Level: Unable to verbalize (05/25/17 0807)  Cognition: No command following (05/25/17 0807)  Speech: Aphasic (comment) (05/25/17 0807)  LUE Motor Response: Rigid (05/25/17 0807)  LLE Motor Response: Tremorous (05/25/17 0807)  RUE Motor Response: Rigid (05/25/17 0807)  RLE Motor Response: Tremorous (05/25/17 0807)   At baseline    Mental Status and Level of Consciousness: Arousable    Pulmonary Status:   O2 Device: Room air (05/25/17 1424)   Adequate oxygenation and airway patent    Complications related to anesthesia: None    Post-anesthesia assessment completed.  No concerns    Signed By: Abby Martinez MD     May 25, 2017

## 2017-05-25 NOTE — PROGRESS NOTES
Verbal consent received for EGD with PEG tube insertion from patients daughter Whit Goel at 58 on 5/25/17 and verified by [facility], [address] [facility] [place].

## 2017-05-25 NOTE — INTERVAL H&P NOTE
H&P Update:  Madelaine Griffiths was seen and examined. History and physical has been reviewed. The patient has been examined. There have been no significant clinical changes since the completion of the originally dated History and Physical.  Patient identified by surgeon; surgical site was confirmed by patient and surgeon.     Signed By: Josiah Felix MD     May 25, 2017 12:41 PM

## 2017-05-25 NOTE — PROGRESS NOTES
SW confirmed that patient is able to return to [facility] - will be short to long bed. Anticipated D/C is Friday- facility is aware.

## 2017-05-25 NOTE — PROCEDURES
EGD Procedure Note    PreOp Diagnosis:    Dysphasia  Weight loss    PostOp Diagnosis:  Severe esophagitis  Successful PEG tube placement    Medications:  Monitored Anesthesia    Procedure:  EGD   Instrument:   After informed consent was obtained, the patient was sedated and the endoscope was inserted  in the mouth and advanced into the duodenum without difficulty. The scope was slowly withdrawn while the mucosa was carefully inspected, including a retroflexed view of the cardia and fundus. Findings:   Esophagus: [place] grade C. Esophagitis from the GE junction to the midesophagus. There is ulceration and some minor oozing. Unable to exclude underlying Workman's esophagus. Stomach: Normal, without ulcers, erosions, or polyps. Duodenum:   Normal  PEG tube: PEG tube placed in standard fashion with assistance from Dr. Carina Saleh. Appropriate site was located with palpation. PEG tube was placed without difficulty, site was reinspected with the scope to confirm correct tube placement. Recommendations:    BID PPI  Okay to use PEG tube for medications today. Hold tube feeds until tomorrow.     Jackqueline Mohs, MD

## 2017-05-25 NOTE — PROGRESS NOTES
Hospitalist Progress Note    2017  Admit Date: 2017  3:27 PM   NAME: David Huston   :  1940   MRN:  [id number]   Attending: Bartolome Hall, *  PCP:  Zac Brock DO    SUBJECTIVE:   Patient admitted  with ROSANA on CKD, hypernatremia, and possible sepsis. Pt with very poor PO intake. Cr has improved to baseline. Na has had moderate improvement with 0.225 NS but still remains elevated at 152. Abx have been d/serjio due to negative infectious work up. Pt has been seen by speech therapy who states no oral readiness or interest. GI has been consulted and pt is determined candidate for PEG tube. CT head neg for acute findinds but does show chronic lacunar infarct. :  Resting comfortably, remains confused      Review of Systems negative with exception of pertinent positives noted above  PHYSICAL EXAM     Visit Vitals    /80    Pulse 74    Temp 97.1 °F (36.2 °C)    Resp 15    Ht 5' 7\" (1.702 m)    Wt 91.2 kg (201 lb)    SpO2 96%    BMI 31.48 kg/m2      Temp (24hrs), Av.5 °F (36.4 °C), Min:96.4 °F (35.8 °C), Max:97.9 °F (36.6 °C)    Oxygen Therapy  O2 Sat (%): 96 % (17 0724)  Pulse via Oximetry: 92 beats per minute (17 0524)  O2 Device: Room air (17 1132)  ETCO2 (mmHg): 97 mmHg (17 0435)    Intake/Output Summary (Last 24 hours) at 17 0858  Last data filed at 17 1945   Gross per 24 hour   Intake              100 ml   Output                0 ml   Net              100 ml      General: Lethargic, confused    Lungs:  CTA Bilaterally.    Heart:  Regular rate and rhythm,  No murmur, rub, or gallop  Abdomen: Soft, Non distended, Non tender, Positive bowel sounds  Extremities: No cyanosis, clubbing or edema      ASSESSMENT      Active Hospital Problems    Diagnosis Date Noted    [facility]ANA [facility]acute kidney injury) ([facility] 2017    Psychogenic paranoid psychosis 2015    Type II diabetes mellitus ([address] 2015    Hypothyroidism 2015  Debility 11/13/2015    COPD (chronic obstructive pulmonary disease) ([facility] 11/13/2015    CAD (coronary artery disease) 11/13/2015    Alzheimer disease 11/13/2015    Schizoaffective disorder ([facility]      Plan:  · adelina resolved  · GI following for PEG  · Continue free water, monitor Na  · Monitor glucose and continue current regimen  · Has long term bed hold until 29th    DVT Prophylaxis: scds    Signed By: Nely Mercado MD     May 25, 2017

## 2017-05-25 NOTE — PROGRESS NOTES
Patient NPO, feeding stopped at midnight for surgery today. IV infiltrated no IV access at present, BS stable.

## 2017-05-25 NOTE — H&P (VIEW-ONLY)
[facility] Consult Note       Consulting GI Physician: Dr. Adebayo Alcaraz    Referring Physician:  Andreea Guadarrama PA-C/Dr. Jeanette Dancer    Consult Date:  5/24/2017    Admit Date:  5/20/2017    Chief Complaint:  Evaluation for PEG tube placement    Subjective:     History of Present Illness:  Patient is a 78 y.o. female with PMH significant for CAD, COPD, TIA, DM, Dementia, and otherwise as outlined below, who is seen in consultation at the request of Dr. Jeanette Dancer for PEG tube placement evaluation. She was admitted 5/20/17 with ROSANA on CKD, dehydration, hypernatremia and possible sepsis, and worsening uncontrolled movements. Her hypernatremia is improved and Cr has returned to baseline. Infectious work up was negative and antibiotic therapy was discontinued 5/23/17. CT head was neg for any acute findings. Throughout her hospital course here it appears that family reported to the primary team that pt has had a poor dietary intake which has been ongoing at least over the past 1month. Speech has been following pt for at least the past few days and note that patient demonstrates no oral readiness nor interest in PO, likely secondary to her Advanced Dementia. Speech has placed recommends for NPO with alternative nutrition/hydration. Pt currently has NGT in place with tube feeds at 50ml/hr. Family reports prior placement of a PEG tube at least 9-10yrs ago when pt was living in [place], [place]- then pulled out by pt. They are interested in placement of PEG feeding tube at this time for improved nutritional support. There are no known recent GI complaints and her daughter confirms this via telephone conversation however this is difficult to evaluate secondary to pt nonverbal status. There are no known significant abdominal surgeries in the past however pt daughter reports other than history on pt medical chart history is unknown. Pt is not on any blood thinners.       PMH:  Past Medical History:   Diagnosis Date    Alzheimer disease     Anxiety     Arthritis     Asthma     CAD (coronary artery disease)     CHF    Cataract     COPD     DEMENTIA     Diabetes ([facility]     GERD (gastroesophageal reflux disease)     H/O seasonal allergies     Hyperlipidemia     Hypertension     Hypothyroidism     Magnesium disorder     Peripheral neuropathy (HCC)     Psychiatric disorder     Psychosis     Schizoaffective disorder ([facility]     Stroke ([facility]     TIA    Tardive dyskinesia        PSH:  Past Surgical History:   Procedure Laterality Date    HX CHOLECYSTECTOMY      feeding tube placed and removed    HX TOTAL ABDOMINAL HYSTERECTOMY      unsure when or for what reason       Allergies: Allergies   Allergen Reactions    Zithromax [Azithromycin] Nausea and Vomiting       Home Medications:  Prior to Admission medications    Medication Sig Start Date End Date Taking? Authorizing Provider   donepezil (ARICEPT) 5 mg tablet Take 5 mg by mouth nightly. Yes Historical Provider   benztropine (COGENTIN) 1 mg tablet Take 1 mg by mouth two (2) times a day. Yes Historical Provider   clonazePAM (KLONOPIN) 0.5 mg tablet Take 0.5 mg by mouth nightly. Yes Historical Provider   divalproex DR (DEPAKOTE) 250 mg tablet Take 250 mg by mouth two (2) times a day. Yes Historical Provider   glucagon (GLUCAGEN) 1 mg injection 1 mg by IntraVENous route as needed. For blood sugar less than 60 and unresponsive   Yes Historical Provider   dextrose 40% (GLUCOSE GEL) 40 % oral gel Take 1 Tube by mouth as needed for Hypoglycemia (if blood sugar less than 60 and responsive). Yes Historical Provider   insulin detemir (LEVEMIR) 100 unit/mL injection 7 Units by SubCUTAneous route every twelve (12) hours. Yes Historical Provider   magnesium oxide (MAG-OX) 400 mg tablet Take 400 mg by mouth daily. Yes Historical Provider   memantine (NAMENDA) 10 mg tablet Take 10 mg by mouth nightly.    Yes Historical Provider   memantine (NAMENDA) 5 mg tablet Take 5 mg by mouth daily. Yes Historical Provider   insulin aspart (NOVOLOG) 100 unit/mL injection by SubCUTAneous route. Sliding scale  151-200=2units  201-250=4units  251-300=6units  301-350=8units  351-400=10units  401-450=12units  451 and above 15units   Yes Historical Provider   ziprasidone (GEODON) 40 mg capsule Take 40 mg by mouth two (2) times daily (with meals). Historical Provider   polyethylene glycol (MIRALAX) 17 gram packet Take 17 g by mouth daily. Indications: CONSTIPATION    Historical Provider   levothyroxine (SYNTHROID) 75 mcg tablet Take 75 mcg by mouth daily.     Historical Provider       Hospital Medications:  Current Facility-Administered Medications   Medication Dose Route Frequency    insulin detemir (LEVEMIR) injection 8 Units  8 Units SubCUTAneous Q12H    donepezil (ARICEPT) tablet 5 mg  5 mg Oral QHS    benztropine (COGENTIN) tablet 0.5 mg  0.5 mg Oral BID    divalproex DR (DEPAKOTE) tablet 250 mg  250 mg Oral BID    memantine (NAMENDA) tablet 10 mg  10 mg Oral DAILY    clonazePAM (KlonoPIN) tablet 1 mg  1 mg Oral BID    sodium chloride 0.225 % in sterile water 1,000 mL infusion   IntraVENous CONTINUOUS    insulin lispro (HUMALOG) injection   SubCUTAneous Q6H    famotidine (PF) (PEPCID) injection 20 mg  20 mg IntraVENous Q24H    levothyroxine (SYNTHROID) tablet 75 mcg  75 mcg Oral ACB    ondansetron (ZOFRAN) injection 4 mg  4 mg IntraVENous Q6H PRN    haloperidol lactate (HALDOL) injection 2 mg  2 mg IntraVENous Q6H PRN    LORazepam (ATIVAN) injection 2 mg  2 mg IntraVENous Q4H PRN       Social History:  Social History   Substance Use Topics    Smoking status: Never Smoker    Smokeless tobacco: Not on file    Alcohol use No       Family History:  Family History   Problem Relation Age of Onset    Depression Mother     Arthritis-osteo Mother     Thyroid Disease Mother     Heart Attack Mother     Hypertension Mother        Review of Systems:  A detailed 10 system ROS is obtained, with pertinent positives as listed above. Difficult to obtain secondary to pt nonverbal status    Diet:  NGT with tube feeds    Objective:     Physical Exam:  Vitals:  Visit Vitals    /71 (BP 1 Location: Right arm, BP Patient Position: At rest)    Pulse 60    Temp 97.2 °F (36.2 °C)    Resp 16    Ht 5' 7\" (1.702 m)    Wt 91.2 kg (201 lb)    SpO2 95%    BMI 31.48 kg/m2     Gen:  Pt is nonverbal.  Eyes open periodically to stimuli. No family present at bedside. Skin:  Face reveal no rashes. HEENT: Sclerae anicteric. NGT in place in left nare  Cardiovascular: Regular rate and rhythm. Respiratory:  Comfortable breathing with no accessory muscle use. Clear breath sounds anteriorly with no wheezes, rales, or rhonchi. GI:  Abdomen Nondistended, Soft, and Nontender. Normal active bowel sounds. No masses palpable.   Rectal:  Deferred    Laboratory:    Recent Labs      05/24/17   0744  05/23/17   1000  05/22/17   1627  05/22/17   1218  05/22/17   0530  05/21/17   2210  05/21/17   1905   WBC   --    --    --   13.2*  14.1*   --    --    HGB   --    --    --   12.0  12.2   --    --    HCT   --    --    --   35.7*  37.3   --    --    PLT   --    --    --   102*  130*   --    --    MCV   --    --    --   91.1  92.8   --    --    NA  151*  150*  153*  152*  154*  156*  159*   K  3.8  3.7  3.9  4.3  4.0  4.2  4.3   CL  116*  118*  120*  120*  120*  124*  125*   CO2  22  24  25  20*  23  20*  19*   BUN  22  34*  40*  42*  44*  49*  51*   CREA  1.15*  1.53*  1.70*  1.73*  1.75*  1.90*  2.06*   CA  8.6  8.4  8.3  8.7  8.6  8.8  8.7   GLU  140*  208*  227*  261*  250*  154*  105*          Assessment:     Principal Problem:    ROSANA (acute kidney injury) ([facility] (5/20/2017)    Active Problems:    CAD (coronary artery disease) (11/13/2015)      COPD (chronic obstructive pulmonary disease) ([place] [address].) (11/13/2015)      Type II diabetes mellitus ([place] [address].) (11/13/2015)      Hypothyroidism (11/13/2015)      Alzheimer disease (11/13/2015)      Psychogenic paranoid psychosis (11/13/2015)      Debility (11/13/2015)      Schizoaffective disorder [facility]) ()       78 y.o. female with PMH significant for CAD, COPD, TIA, DM, Dementia, and otherwise as outlined below, who is seen in consultation at the request of Dr. Shasta Simeon for PEG tube placement evaluation due to poor dietary intake x1mo per pt family. She was admitted 5/20/17 with ROSANA on CKD, dehydration, hypernatremia and possible sepsis, and worsening uncontrolled movements with neg CT head and now improving. Speech has been following and note pt with no oral readiness nor interest in PO, likely secondary to her Advanced Dementia. Speech has placed recommendations for NPO with alternative nutrition/hydration. Pt currently has NGT in place with tube feeds at 50ml/hr. Plan:     - Pt would benefit from placement of PEG feeding tube. Discussed risks/benefits of placement of PEG tube with pt daughter/POA Oscar Huge [phone]) and she expressed interest for PEG placement from her as well as from her other family. ? Plan for EGD with peg sometime tomorrow. Will review plan with Dr. Tori Bangura. In the meantime continue NGT with tube feeds. Will follow. Erica Siu PA-C    Patient is seen and examined in collaboration with Dr. Morris Hummel. Assessment and plan as per Dr. Morris Hummel.

## 2017-05-25 NOTE — ANESTHESIA PREPROCEDURE EVALUATION
Anesthetic History   No history of anesthetic complications            Review of Systems / Medical History  Patient summary reviewed and pertinent labs reviewed    Pulmonary    COPD        Asthma        Neuro/Psych       CVA  Dementia    Comments: Very lethargic. Opens eyes to voice, but otherwise will not respond or talk to me.  Cardiovascular    Hypertension          CAD and hyperlipidemia    Exercise tolerance: <4 METS     GI/Hepatic/Renal     GERD    Renal disease: ARF       Endo/Other    Diabetes  Hypothyroidism  Obesity     Other Findings   Comments: H/o tardive dyskinesia  Peripheral Neuropathy  Hypernatremia         Physical Exam    Airway    TM Distance: 4 - 6 cm        Comments: Not cooperative with airway exam Cardiovascular  Regular rate and rhythm,  S1 and S2 normal,  no murmur, click, rub, or gallop             Dental  No notable dental hx       Pulmonary  Breath sounds clear to auscultation               Abdominal  GI exam deferred       Other Findings            Anesthetic Plan    ASA: 4  Anesthesia type: total IV anesthesia          Induction: Intravenous  Anesthetic plan and risks discussed with: Son / Daughter      Telephone consent obtained from patient's daughter, Jairo Franco at [phone]

## 2017-05-25 NOTE — PROGRESS NOTES
Asked to start a PIV. Using U/S assistance, placed a 20 g jelco in patients right forearm.   Katherine Krishna RN, VAT

## 2017-05-26 VITALS
TEMPERATURE: 96.3 F | OXYGEN SATURATION: 98 % | SYSTOLIC BLOOD PRESSURE: 113 MMHG | HEART RATE: 65 BPM | RESPIRATION RATE: 20 BRPM | HEIGHT: 67 IN | DIASTOLIC BLOOD PRESSURE: 58 MMHG | WEIGHT: 201 LBS | BODY MASS INDEX: 31.55 KG/M2

## 2017-05-26 LAB
GLUCOSE BLD STRIP.AUTO-MCNC: 171 MG/DL (ref 65–100)
GLUCOSE BLD STRIP.AUTO-MCNC: 193 MG/DL (ref 65–100)
GLUCOSE BLD STRIP.AUTO-MCNC: 213 MG/DL (ref 65–100)

## 2017-05-26 PROCEDURE — 74011636637 HC RX REV CODE- 636/637: Performed by: INTERNAL MEDICINE

## 2017-05-26 PROCEDURE — 74011250637 HC RX REV CODE- 250/637: Performed by: INTERNAL MEDICINE

## 2017-05-26 PROCEDURE — 82962 GLUCOSE BLOOD TEST: CPT

## 2017-05-26 PROCEDURE — 74011250636 HC RX REV CODE- 250/636: Performed by: INTERNAL MEDICINE

## 2017-05-26 RX ADMIN — MEMANTINE HYDROCHLORIDE 10 MG: 5 TABLET ORAL at 09:28

## 2017-05-26 RX ADMIN — BENZTROPINE MESYLATE 0.5 MG: 1 TABLET ORAL at 09:29

## 2017-05-26 RX ADMIN — INSULIN LISPRO 2 UNITS: 100 INJECTION, SOLUTION INTRAVENOUS; SUBCUTANEOUS at 06:00

## 2017-05-26 RX ADMIN — DEXTROSE MONOHYDRATE 75 ML/HR: 5 INJECTION, SOLUTION INTRAVENOUS at 01:40

## 2017-05-26 RX ADMIN — CLONAZEPAM 1 MG: 1 TABLET ORAL at 09:28

## 2017-05-26 RX ADMIN — INSULIN LISPRO 2 UNITS: 100 INJECTION, SOLUTION INTRAVENOUS; SUBCUTANEOUS at 12:00

## 2017-05-26 RX ADMIN — INSULIN LISPRO 4 UNITS: 100 INJECTION, SOLUTION INTRAVENOUS; SUBCUTANEOUS at 00:40

## 2017-05-26 RX ADMIN — LEVOTHYROXINE SODIUM 75 MCG: 75 TABLET ORAL at 07:30

## 2017-05-26 RX ADMIN — INSULIN DETEMIR 4 UNITS: 100 INJECTION, SOLUTION SUBCUTANEOUS at 09:00

## 2017-05-26 NOTE — PROGRESS NOTES
Spoke to patients daughter Sylvia Correa, report given on her status. She also asked for the Doctor to giver her a call Dr. Adrian Gomez was informed of this, later he did try to giver her a call back but their was no answer.

## 2017-05-26 NOTE — PROGRESS NOTES
Care Management Interventions  PCP Verified by CM: Yes  Transition of Care Consult (CM Consult): Discharge Planning, SNF  Current Support Network: Nursing Facility  Confirm Follow Up Transport: Other (see comment)  Plan discussed with Pt/Family/Caregiver: Yes  Freedom of Choice Offered: Yes  Discharge Location  Discharge Placement: Skilled nursing facility    Pt will return to [facility] at D/C today. Pt will initially go to STR then transfer back to her long term skilled bed. SW called and spoke to [address] [place], [place] [address]emil Anaya to advised of D/C time. Daughter/ Vin Robert asked that the physician please call when available. Facility advised of D/C time.

## 2017-05-26 NOTE — PROGRESS NOTES
Problem: Nutrition Deficit  Goal: *Optimize nutritional status  Nutrition F/U:TF Management. (Dr. Gabriella Nageotte)  Assessment:  · No new weight  · Remains TF dependent d/t AMS, hx iof dementia and CVA and inability to take po diet as per SLP evaluation. PEG placed 5-25. Pt had tolerated progression of TF to goal of 50 ml/hr as of 70466 on 5-24 with residuals 0-100 ml. TF held at MN 5-25 d/t plan for PEG placement. Abdomen semi-soft with hypoactive BS's, date of last BM 5-25. Ready to use PEG tube per GI to resume TF this AM.  · Na 152, Cl 118 (5-24). Currently receiving IVF of D5W at 75 ml/hr. GFR has improved to >60 with hydration. May benefit from continued addtional free water via PEG at least for the next several days. · POC Glucoses 137-213 mg/dl, hx of DM on 7 units Levemir bid and SSI PTA. Currently receiving 4 units Levemir every 12 hrs and SSI. Has required 4-14 units SSI/d for the past 4 days. Macronutrient Needs:  Estimated calorie needs:6983-7014 ximena/day (15-20 ximena/kg of estimated weight)   Revised Estimated protein needs:61-74 gm pro/day (1-1.2gm pro/kgIBW/day)   Max CHO/day - 225 gm CHO/day (50% ximena/day)   Fluid/day - 1.4-1.8 liters/day (1 ml/ximena/day)  Intake/Comparative Standards: Current IVF and NPO status do not meet kcal or protein needs     Intervention:  Meals and Snacks: NPO per SLP. Enteral Nutrition: Resume TF of  Glucerna 1.2 at 50ml/hr with a 50 ml/hr water flush ml/hr to provide 1440 calories/day (100% calorie goal), 72 grams protein/day (100% protein goal), 138 grams CHO/day (does not exceed max CHO limit) and 2208 ml water/day (>100% fluid goal). Coordination of nutrition care: Discussed with SANDRINE Silva . [facility] is to go to [facility] today. If NH prefers bolus TF schedule, TF can be changed to bolus TF by NH KOLE.      Jazmyn Resides, [address] [place] [address], [address] [place], [place] [address] [place], [address] [place] [address]

## 2017-05-26 NOTE — PROGRESS NOTES
GI DAILY PROGRESS NOTE    Admit Date:  5/20/2017    Today's Date:  5/26/2017    CC:  Feeding difficulties    Subjective:     Patient is nonverbal.   S/p PEG placement yesterday. Medications:   Current Facility-Administered Medications   Medication Dose Route Frequency    pantoprazole (PROTONIX) 40 mg in sodium chloride 0.9 % 10 mL injection  40 mg IntraVENous Q12H    dextrose 5% infusion  75 mL/hr IntraVENous CONTINUOUS    insulin detemir (LEVEMIR) injection 4 Units  4 Units SubCUTAneous Q12H    donepezil (ARICEPT) tablet 5 mg  5 mg Oral QHS    benztropine (COGENTIN) tablet 0.5 mg  0.5 mg Oral BID    divalproex DR (DEPAKOTE) tablet 250 mg  250 mg Oral BID    memantine (NAMENDA) tablet 10 mg  10 mg Oral DAILY    clonazePAM (KlonoPIN) tablet 1 mg  1 mg Oral BID    insulin lispro (HUMALOG) injection   SubCUTAneous Q6H    levothyroxine (SYNTHROID) tablet 75 mcg  75 mcg Oral ACB    ondansetron (ZOFRAN) injection 4 mg  4 mg IntraVENous Q6H PRN    haloperidol lactate (HALDOL) injection 2 mg  2 mg IntraVENous Q6H PRN    LORazepam (ATIVAN) injection 2 mg  2 mg IntraVENous Q4H PRN       Review of Systems:  ROS was obtained, with pertinent positives as listed above. No chest pain or SOB. Diet:      Objective:   Vitals:  Visit Vitals    /74    Pulse (!) 59    Temp 96.7 °F (35.9 °C)    Resp 20    Ht 5' 7\" (1.702 m)    Wt 91.2 kg (201 lb)    SpO2 96%    BMI 31.48 kg/m2     Intake/Output:     05/24 1901 - 05/26 0700  In: 1019 [I.V.:969]  Out: 3   Exam:  General appearance: alert, cooperative, no distress  Lungs: clear to auscultation bilaterally anteriorly  Heart: regular rate and rhythm  Abdomen: soft, non-tender.  Bowel sounds normal. No masses, no organomegaly  Extremities: extremities normal, atraumatic, no cyanosis or edema  Neuro:  alert and oriented    Data Review (Labs):    Recent Labs      05/24/17   1249  05/24/17   0744  05/23/17   1000   NA  152*  151*  150*   K  4.3  3.8  3.7   CL 118*  116*  118*   CO2  21  22  24   BUN  21  22  34*   CREA  1.10*  1.15*  1.53*   CA  8.4  8.6  8.4   GLU  170*  140*  208*       Assessment:     Principal Problem:  ROSANA (acute kidney injury) ([facility] (5/20/2017)    Active Problems:  CAD (coronary artery disease) (11/13/2015)  COPD (chronic obstructive pulmonary disease) ([facility] (11/13/2015)  Type II diabetes mellitus ([facility] (11/13/2015)  Hypothyroidism (11/13/2015)  Alzheimer disease (11/13/2015)  Psychogenic paranoid psychosis (11/13/2015)  Debility (11/13/2015)  Schizoaffective disorder (HCC) ()    Patient with oropharyngeal dysphagia, malnutrition and weight loss. S/p PEG yesterday. Plan:     OK to use PEG for meds & tube feeds. Nutrition consult. Will sign-off. Pls call if we can be of further assistance or any issues with PEG. Supa Lott PA-C    I have seen and examined this patient, and agree with above assessment and plan. PEG intact.   OK to start TFs.  F/U GI PRN  Cont PPI for esophagititis    Jose Harrison MD

## 2017-05-26 NOTE — DISCHARGE SUMMARY
Hospitalist Discharge Summary     Patient ID:  Ruddy Negrete  [id number]  78 y.o.  1940  Admit date: 5/20/2017  3:27 PM  Discharge date and time: 5/26/2017  Attending: Abi Winters, *  PCP:  Faith Mcclellan DO  Treatment Team: Attending Provider: Abi Winters MD; Utilization Review: Madelyn Carbone RN; Consulting Provider: Jeanmarie Stout MD; Care Manager: Rosio Dior    Principal Diagnosis ROSANA (acute kidney injury) [facility])   Principal Problem:    ROSANA (acute kidney injury) ([facility] (5/20/2017)    Active Problems:    CAD (coronary artery disease) (11/13/2015)      COPD (chronic obstructive pulmonary disease) ([facility] (11/13/2015)      Type II diabetes mellitus ([facility] (11/13/2015)      Hypothyroidism (11/13/2015)      Alzheimer disease (11/13/2015)      Psychogenic paranoid psychosis (11/13/2015)      Debility (11/13/2015)      Schizoaffective disorder ([facility] ()     HPI: HISTORY OF PRESENT ILLNESS: This is a 74-year-old female patient   who has a past medical history of obesity, hypertension,   diabetes type 2, and advanced dementia, who came into the   emergency room with the chief complaint of worsening mental   status.     This is a 74-year-old female patient who was brought into the   emergency room by her daughters. According to them, for the last   2 months they have noticed an important decline in the general   condition of Mrs. Batres. According to them, she has become more   confused, agitated, she has abnormal involuntary movements   and tremors. She is not eating or drinking on a regular basis,   and her medications have been exchanged several times in the   last week with the hope of improvement; however, so far, her   condition has been getting worse. Today, she was brought into   the emergency room because in the last 4 days she has not wanted   to eat or drink, and her facility sent her here for further   evaluation.  In the emergency room, she was found to have a blood   pressure of 130/67, her heart rate was 100, her respiratory rate   was 24, O2 saturation was 98%. The patient was having abnormal   movements of her arms and head and she was extremely agitated   and with evidence of dehydration. Her initial blood workup   showed a lactic acid of 2.3, white blood cell count of 11.8,   sodium of 160, potassium of 4.3, creatinine of 2.4, with   baseline around 1.5. Her chest x-ray was basically unremarkable. She was started on IV hydration, and she was presented to the   hospitalist team for admission. Hospital Course:  Please refer to the admission H&P for details of presentation. In summary, the patient admitted 05/20 with ROSANA on CKD, hypernatremia, and possible sepsis. Pt with very poor PO intake. Cr has improved to baseline. Na has had moderate improvement with D5. Abx have been d/serjio due to negative infectious work up. Pt has been seen by speech therapy who states no oral readiness or interest. GI was consulted and peg was placed 5/25. Nutrition consulted for TF. Pt is currently stable for dc back to snf. Will follow up with pcp. Labs: Results:       Chemistry Recent Labs      05/24/17   1249  05/24/17   0744  05/23/17   1000   GLU  170*  140*  208*   NA  152*  151*  150*   K  4.3  3.8  3.7   CL  118*  116*  118*   CO2  21  22  24   BUN  21  22  34*   CREA  1.10*  1.15*  1.53*   CA  8.4  8.6  8.4   AGAP  13  13  8      CBC w/Diff No results for input(s): WBC, RBC, HGB, HCT, PLT, GRANS, LYMPH, EOS, HGBEXT, HCTEXT, PLTEXT in the last 72 hours. Cardiac Enzymes No results for input(s): CPK, CKND1, GYPSY in the last 72 hours. No lab exists for component: CKRMB, TROIP   Coagulation No results for input(s): PTP, INR, APTT in the last 72 hours.     No lab exists for component: INREXT    Lipid Panel No results found for: CHOL, CHOLPOCT, CHOLX, CHLST, CHOLV, [id number], HDL, LDL, NLDLCT, DLDL, LDLC, DLDLP, [id number], VLDLC, VLDL, TGL, TGLX, TRIGL, [id number], TRIGP, TGLPOCT, [id number], CHHD, CHHDX   BNP No results for input(s): BNPP in the last 72 hours. Liver Enzymes No results for input(s): TP, ALB, TBIL, AP, SGOT, GPT in the last 72 hours. No lab exists for component: DBIL   Thyroid Studies Lab Results   Component Value Date/Time    TSH 2.740 11/13/2015 01:49 PM            Discharge Exam:  Visit Vitals    /74    Pulse (!) 59    Temp 96.7 °F (35.9 °C)    Resp 20    Ht 5' 7\" (1.702 m)    Wt 91.2 kg (201 lb)    SpO2 96%    BMI 31.48 kg/m2     General appearance: lethargic  Lungs: clear to auscultation bilaterally  Heart: regular rate and rhythm, S1, S2 normal, no murmur, click, rub or gallop  Abdomen: soft, non-tender. Bowel sounds normal. Peg inplace  Extremities: no cyanosis or edema  Neurologic: nonverbal, not following commands    Disposition: SNF  Discharge Condition: stable  Patient Instructions:   Current Discharge Medication List      CONTINUE these medications which have NOT CHANGED    Details   donepezil (ARICEPT) 5 mg tablet Take 5 mg by mouth nightly. benztropine (COGENTIN) 1 mg tablet Take 1 mg by mouth two (2) times a day. clonazePAM (KLONOPIN) 0.5 mg tablet Take 0.5 mg by mouth nightly. divalproex DR (DEPAKOTE) 250 mg tablet Take 250 mg by mouth two (2) times a day. glucagon (GLUCAGEN) 1 mg injection 1 mg by IntraVENous route as needed. For blood sugar less than 60 and unresponsive      dextrose 40% (GLUCOSE GEL) 40 % oral gel Take 1 Tube by mouth as needed for Hypoglycemia (if blood sugar less than 60 and responsive). insulin detemir (LEVEMIR) 100 unit/mL injection 7 Units by SubCUTAneous route every twelve (12) hours. magnesium oxide (MAG-OX) 400 mg tablet Take 400 mg by mouth daily. memantine (NAMENDA) 10 mg tablet Take 10 mg by mouth nightly. insulin aspart (NOVOLOG) 100 unit/mL injection by SubCUTAneous route.  Sliding scale  151-200=2units  201-250=4units  251-300=6units  301-350=8units  351-400=10units  401-450=12units  451 and above 15units      ziprasidone (GEODON) 40 mg capsule Take 40 mg by mouth two (2) times daily (with meals). polyethylene glycol (MIRALAX) 17 gram packet Take 17 g by mouth daily. Indications: CONSTIPATION      levothyroxine (SYNTHROID) 75 mcg tablet Take 75 mcg by mouth daily.              Activity: Activity as tolerated  Diet: PEG feeding   Wound Care: As directed    Follow-up  ·   With pcp  Time spent to discharge patient 35 minutes  Signed:  Hilda Cash MD  5/26/2017  9:45 AM

## 2017-05-27 LAB
BACTERIA SPEC CULT: NORMAL
BACTERIA SPEC CULT: NORMAL
SERVICE CMNT-IMP: NORMAL
SERVICE CMNT-IMP: NORMAL

## 2017-06-11 ENCOUNTER — HOSPITAL ENCOUNTER (OUTPATIENT)
Dept: LAB | Age: 77
Discharge: HOME | End: 2017-06-11

## 2017-06-11 LAB
ALBUMIN SERPL BCP-MCNC: 3 G/DL (ref 3.2–4.6)
ALBUMIN/GLOB SERPL: 0.6 {RATIO} (ref 1.2–3.5)
ALP SERPL-CCNC: 96 U/L (ref 50–136)
ALT SERPL-CCNC: 41 U/L (ref 12–65)
ANION GAP BLD CALC-SCNC: 10 MMOL/L (ref 7–16)
APPEARANCE UR: ABNORMAL
AST SERPL W P-5'-P-CCNC: 25 U/L (ref 15–37)
BACTERIA URNS QL MICRO: NORMAL /HPF
BILIRUB SERPL-MCNC: 0.6 MG/DL (ref 0.2–1.1)
BILIRUB UR QL: NEGATIVE
BUN SERPL-MCNC: 62 MG/DL (ref 8–23)
CALCIUM SERPL-MCNC: 9.3 MG/DL (ref 8.3–10.4)
CASTS URNS QL MICRO: 0 /LPF
CHLORIDE SERPL-SCNC: 112 MMOL/L (ref 98–107)
CO2 SERPL-SCNC: 27 MMOL/L (ref 21–32)
COLOR UR: YELLOW
CREAT SERPL-MCNC: 2.03 MG/DL (ref 0.6–1)
CRYSTALS URNS QL MICRO: 0 /LPF
EPI CELLS #/AREA URNS HPF: 0 /HPF
ERYTHROCYTE [DISTWIDTH] IN BLOOD BY AUTOMATED COUNT: 15.9 % (ref 11.9–14.6)
GLOBULIN SER CALC-MCNC: 4.9 G/DL (ref 2.3–3.5)
GLUCOSE SERPL-MCNC: 316 MG/DL (ref 65–100)
GLUCOSE UR STRIP.AUTO-MCNC: 100 MG/DL
HCT VFR BLD AUTO: 40.4 % (ref 35.8–46.3)
HGB BLD-MCNC: 12.9 G/DL (ref 11.7–15.4)
HGB UR QL STRIP: ABNORMAL
KETONES UR QL STRIP.AUTO: NEGATIVE MG/DL
LEUKOCYTE ESTERASE UR QL STRIP.AUTO: ABNORMAL
MCH RBC QN AUTO: 30.7 PG (ref 26.1–32.9)
MCHC RBC AUTO-ENTMCNC: 31.9 G/DL (ref 31.4–35)
MCV RBC AUTO: 96.2 FL (ref 79.6–97.8)
MUCOUS THREADS URNS QL MICRO: 0 /LPF
NITRITE UR QL STRIP.AUTO: NEGATIVE
PH UR STRIP: 6 [PH] (ref 5–9)
PLATELET # BLD AUTO: 278 K/UL (ref 150–450)
PMV BLD AUTO: 12 FL (ref 10.8–14.1)
POTASSIUM SERPL-SCNC: 4.2 MMOL/L (ref 3.5–5.1)
PROT SERPL-MCNC: 7.9 G/DL (ref 6.3–8.2)
PROT UR STRIP-MCNC: ABNORMAL MG/DL
RBC # BLD AUTO: 4.2 M/UL (ref 4.05–5.25)
RBC #/AREA URNS HPF: NORMAL /HPF
SODIUM SERPL-SCNC: 149 MMOL/L (ref 136–145)
SP GR UR REFRACTOMETRY: 1.02 (ref 1–1.02)
UROBILINOGEN UR QL STRIP.AUTO: 0.2 EU/DL (ref 0.2–1)
WBC # BLD AUTO: 13.9 K/UL (ref 4.3–11.1)
WBC URNS QL MICRO: NORMAL /HPF
YEAST URNS QL MICRO: NORMAL

## 2017-06-11 PROCEDURE — 80053 COMPREHEN METABOLIC PANEL: CPT | Performed by: GENERAL PRACTICE

## 2017-06-11 PROCEDURE — 85027 COMPLETE CBC AUTOMATED: CPT | Performed by: GENERAL PRACTICE

## 2017-06-11 PROCEDURE — 87106 FUNGI IDENTIFICATION YEAST: CPT | Performed by: GENERAL PRACTICE

## 2017-06-11 PROCEDURE — 81003 URINALYSIS AUTO W/O SCOPE: CPT | Performed by: GENERAL PRACTICE

## 2017-06-11 PROCEDURE — 87086 URINE CULTURE/COLONY COUNT: CPT | Performed by: GENERAL PRACTICE

## 2017-06-11 PROCEDURE — 81015 MICROSCOPIC EXAM OF URINE: CPT | Performed by: GENERAL PRACTICE

## 2017-06-15 LAB
BACTERIA SPEC CULT: ABNORMAL
SERVICE CMNT-IMP: ABNORMAL

## (undated) DEVICE — CANNULA NSL ORAL AD FOR CAPNOFLEX CO2 O2 AIRLFE

## (undated) DEVICE — BLOCK BITE AD 60FR W/ VELC STRP ADDRESSES MOST PT AND

## (undated) DEVICE — MEDI-VAC YANKAUER SUCTION HANDLE W/BULBOUS TIP: Brand: CARDINAL HEALTH

## (undated) DEVICE — KIT GASTMY PERC PEG PULL 20FR -- ENDOVIVE BX/2

## (undated) DEVICE — CONNECTOR TBNG OD5-7MM O2 END DISP

## (undated) DEVICE — KENDALL RADIOLUCENT FOAM MONITORING ELECTRODE RECTANGULAR SHAPE: Brand: KENDALL

## (undated) DEVICE — 1200 GUARD II KIT W/5MM TUBE W/O VAC TUBE: Brand: GUARDIAN